# Patient Record
Sex: MALE | Race: BLACK OR AFRICAN AMERICAN | NOT HISPANIC OR LATINO | Employment: OTHER | URBAN - METROPOLITAN AREA
[De-identification: names, ages, dates, MRNs, and addresses within clinical notes are randomized per-mention and may not be internally consistent; named-entity substitution may affect disease eponyms.]

---

## 2023-03-08 ENCOUNTER — TELEPHONE (OUTPATIENT)
Dept: PRIMARY CARE | Facility: CLINIC | Age: 68
End: 2023-03-08
Payer: COMMERCIAL

## 2023-03-08 DIAGNOSIS — J44.9 CHRONIC OBSTRUCTIVE PULMONARY DISEASE, UNSPECIFIED COPD TYPE (MULTI): ICD-10-CM

## 2023-03-08 DIAGNOSIS — Z00.00 HEALTH CARE MAINTENANCE: ICD-10-CM

## 2023-03-08 DIAGNOSIS — I73.9 CLAUDICATION OF RIGHT LOWER EXTREMITY (CMS-HCC): ICD-10-CM

## 2023-03-08 RX ORDER — PREDNISONE 5 MG/1
1 TABLET ORAL DAILY
COMMUNITY
Start: 2023-01-25 | End: 2023-03-08 | Stop reason: SDUPTHER

## 2023-03-08 RX ORDER — NITROGLYCERIN 0.4 MG/1
TABLET SUBLINGUAL
COMMUNITY
Start: 2022-01-25 | End: 2023-03-08 | Stop reason: SDUPTHER

## 2023-03-08 RX ORDER — RIVAROXABAN 2.5 MG/1
2.5 TABLET, FILM COATED ORAL 2 TIMES DAILY
COMMUNITY
End: 2023-03-08 | Stop reason: SDUPTHER

## 2023-03-09 RX ORDER — PREDNISONE 5 MG/1
5 TABLET ORAL DAILY
Qty: 90 TABLET | Refills: 3 | Status: SHIPPED | OUTPATIENT
Start: 2023-03-09 | End: 2024-02-26 | Stop reason: SDUPTHER

## 2023-03-09 RX ORDER — NITROGLYCERIN 0.4 MG/1
0.4 TABLET SUBLINGUAL EVERY 5 MIN PRN
Qty: 30 TABLET | Refills: 3 | Status: SHIPPED | OUTPATIENT
Start: 2023-03-09 | End: 2024-04-12 | Stop reason: ALTCHOICE

## 2023-03-09 RX ORDER — RIVAROXABAN 2.5 MG/1
2.5 TABLET, FILM COATED ORAL 2 TIMES DAILY
Qty: 180 TABLET | Refills: 3 | Status: SHIPPED | OUTPATIENT
Start: 2023-03-09 | End: 2024-04-12 | Stop reason: ALTCHOICE

## 2023-03-16 ENCOUNTER — TELEPHONE (OUTPATIENT)
Dept: PRIMARY CARE | Facility: CLINIC | Age: 68
End: 2023-03-16
Payer: COMMERCIAL

## 2023-03-16 DIAGNOSIS — E11.9 TYPE 2 DIABETES MELLITUS WITHOUT COMPLICATION, WITHOUT LONG-TERM CURRENT USE OF INSULIN (MULTI): ICD-10-CM

## 2023-03-16 RX ORDER — BLOOD-GLUCOSE METER
EACH MISCELLANEOUS
COMMUNITY
End: 2023-03-16 | Stop reason: SDUPTHER

## 2023-03-17 RX ORDER — BLOOD-GLUCOSE METER
EACH MISCELLANEOUS
Qty: 100 STRIP | Refills: 3 | Status: SHIPPED | OUTPATIENT
Start: 2023-03-17 | End: 2023-09-22 | Stop reason: ALTCHOICE

## 2023-03-20 ENCOUNTER — TELEPHONE (OUTPATIENT)
Dept: PRIMARY CARE | Facility: CLINIC | Age: 68
End: 2023-03-20
Payer: COMMERCIAL

## 2023-03-20 DIAGNOSIS — E11.9 TYPE 2 DIABETES MELLITUS WITHOUT COMPLICATION, WITHOUT LONG-TERM CURRENT USE OF INSULIN (MULTI): Primary | ICD-10-CM

## 2023-03-21 LAB
NON-UH HIE CALCULATED LDL CHOLESTEROL: 52 MG/DL (ref 60–130)
NON-UH HIE CHOLESTEROL: 168 MG/DL (ref 100–200)
NON-UH HIE HDL CHOLESTEROL: 106 MG/DL (ref 40–60)
NON-UH HIE HGB A1C: 5.5 %
NON-UH HIE PROSTATIC SPECIFIC ANTIGEN: 1.3 NG/ML (ref 0–4)
NON-UH HIE SED RATE WESTERGREN: 8 MM/HR (ref 0–20)
NON-UH HIE TOTAL CHOL/HDL CHOL RATIO: 1.6
NON-UH HIE TRIGLYCERIDES: 51 MG/DL (ref 30–150)
NON-UH HIE TSH: 0.37 UIU/ML (ref 0.55–4.78)
NON-UH HIE URIC ACID: 2.6 (ref 3.7–9.2)
NON-UH HIE VIT D 25: 25 NG/ML

## 2023-03-21 RX ORDER — LANCETS
1 EACH MISCELLANEOUS DAILY
Qty: 100 EACH | Refills: 3 | Status: SHIPPED | OUTPATIENT
Start: 2023-03-21 | End: 2023-08-01 | Stop reason: ALTCHOICE

## 2023-03-22 LAB — NON-UH HIE ANTI-NUCLEAR ANTIBODY: NEGATIVE

## 2023-03-23 LAB — NON-UH HIE RHEUMATOID FACTOR: NEGATIVE

## 2023-04-06 LAB
NON-UH HIE APPEARANCE, U: CLEAR
NON-UH HIE BACTERIA, U: ABNORMAL
NON-UH HIE BILIRUBIN, U: NEGATIVE
NON-UH HIE BLOOD, U: NEGATIVE
NON-UH HIE COLOR, U: YELLOW
NON-UH HIE CREATININE, URINE MG/DL: 112.8 MG/DL
NON-UH HIE GLUCOSE QUAL, U: NEGATIVE
NON-UH HIE KETONES, U: NEGATIVE
NON-UH HIE LEUKOCYTE ESTERASE, U: ABNORMAL
NON-UH HIE MICROALBUMIN, URINE MG/L: <5 MG/L
NON-UH HIE MICROALBUMIN/CREATININE RATIO: <3 MG MALB/GM CREAT (ref 0–30)
NON-UH HIE NITRITE, U: NEGATIVE
NON-UH HIE PH, U: 5 (ref 4.5–8)
NON-UH HIE PROTEIN, U: NEGATIVE
NON-UH HIE RBC/HPF, U: 1 #/HPF (ref 0–3)
NON-UH HIE SPECIFIC GRAVITY, U: 1.01 (ref 1–1.03)
NON-UH HIE SQUAMOUS EPITHELIAL CELLS, U: <1 #/HPF
NON-UH HIE U MICRO: ABNORMAL
NON-UH HIE UROBILINOGEN QUAL, U: ABNORMAL
NON-UH HIE WBC/HPF, U: 2 #/HPF (ref 0–5)

## 2023-04-07 ENCOUNTER — TELEPHONE (OUTPATIENT)
Dept: PRIMARY CARE | Facility: CLINIC | Age: 68
End: 2023-04-07
Payer: COMMERCIAL

## 2023-04-07 DIAGNOSIS — N39.0 URINARY TRACT INFECTION WITHOUT HEMATURIA, SITE UNSPECIFIED: ICD-10-CM

## 2023-04-07 RX ORDER — CEPHALEXIN 250 MG/1
250 CAPSULE ORAL 2 TIMES DAILY
Qty: 6 CAPSULE | Refills: 0 | Status: SHIPPED | OUTPATIENT
Start: 2023-04-07 | End: 2023-06-26 | Stop reason: ALTCHOICE

## 2023-04-07 NOTE — TELEPHONE ENCOUNTER
----- Message from Preeti Slater MD sent at 4/7/2023 12:24 PM EDT -----  Mild UTI advised Macrobid 100 twice a day for 3 days

## 2023-04-07 NOTE — TELEPHONE ENCOUNTER
Advised and he would rather take a tablet form so I checked with dr carr and he said it is okay that we call in keflex 250mg bid #6     Pt aware

## 2023-05-01 ENCOUNTER — TELEPHONE (OUTPATIENT)
Dept: PRIMARY CARE | Facility: CLINIC | Age: 68
End: 2023-05-01
Payer: COMMERCIAL

## 2023-05-01 DIAGNOSIS — J30.9 ALLERGIC RHINITIS, UNSPECIFIED SEASONALITY, UNSPECIFIED TRIGGER: ICD-10-CM

## 2023-05-01 DIAGNOSIS — J44.9 CHRONIC OBSTRUCTIVE PULMONARY DISEASE, UNSPECIFIED COPD TYPE (MULTI): ICD-10-CM

## 2023-05-01 DIAGNOSIS — E11.9 TYPE 2 DIABETES MELLITUS WITHOUT COMPLICATION, WITHOUT LONG-TERM CURRENT USE OF INSULIN (MULTI): ICD-10-CM

## 2023-05-01 DIAGNOSIS — M10.9 GOUT, UNSPECIFIED CAUSE, UNSPECIFIED CHRONICITY, UNSPECIFIED SITE: ICD-10-CM

## 2023-05-01 RX ORDER — FEBUXOSTAT 40 MG/1
1 TABLET, FILM COATED ORAL DAILY
COMMUNITY
Start: 2023-01-27 | End: 2023-05-01 | Stop reason: SDUPTHER

## 2023-05-01 RX ORDER — ROFLUMILAST 500 UG/1
TABLET ORAL
Qty: 90 TABLET | Refills: 1 | Status: SHIPPED | OUTPATIENT
Start: 2023-05-01 | End: 2023-06-05 | Stop reason: SDUPTHER

## 2023-05-01 RX ORDER — MONTELUKAST SODIUM 10 MG/1
10 TABLET ORAL DAILY
COMMUNITY
End: 2023-05-01 | Stop reason: SDUPTHER

## 2023-05-01 RX ORDER — MONTELUKAST SODIUM 10 MG/1
10 TABLET ORAL DAILY
Qty: 90 TABLET | Refills: 3 | Status: SHIPPED | OUTPATIENT
Start: 2023-05-01 | End: 2024-04-18

## 2023-05-01 RX ORDER — FEBUXOSTAT 40 MG/1
40 TABLET, FILM COATED ORAL DAILY
Qty: 90 TABLET | Refills: 3 | Status: SHIPPED | OUTPATIENT
Start: 2023-05-01 | End: 2023-08-01 | Stop reason: ALTCHOICE

## 2023-05-01 RX ORDER — FLUTICASONE PROPIONATE 50 MCG
1 SPRAY, SUSPENSION (ML) NASAL DAILY
Qty: 16 G | Refills: 3 | Status: SHIPPED | OUTPATIENT
Start: 2023-05-01 | End: 2024-04-12 | Stop reason: ALTCHOICE

## 2023-05-03 RX ORDER — SITAGLIPTIN 50 MG/1
50 TABLET, FILM COATED ORAL DAILY
COMMUNITY
End: 2023-05-03 | Stop reason: SDUPTHER

## 2023-05-08 ENCOUNTER — TELEPHONE (OUTPATIENT)
Dept: PRIMARY CARE | Facility: CLINIC | Age: 68
End: 2023-05-08
Payer: COMMERCIAL

## 2023-05-08 DIAGNOSIS — Z00.00 HEALTH CARE MAINTENANCE: ICD-10-CM

## 2023-05-12 RX ORDER — AMITRIPTYLINE HYDROCHLORIDE 10 MG/1
10 TABLET, FILM COATED ORAL NIGHTLY
Qty: 90 TABLET | Refills: 3 | Status: SHIPPED | OUTPATIENT
Start: 2023-05-12 | End: 2023-06-26 | Stop reason: ALTCHOICE

## 2023-05-12 RX ORDER — GLIMEPIRIDE 4 MG/1
4 TABLET ORAL
Qty: 90 TABLET | Refills: 3 | Status: SHIPPED | OUTPATIENT
Start: 2023-05-12 | End: 2023-06-26 | Stop reason: ALTCHOICE

## 2023-05-16 ENCOUNTER — TELEPHONE (OUTPATIENT)
Dept: PRIMARY CARE | Facility: CLINIC | Age: 68
End: 2023-05-16
Payer: COMMERCIAL

## 2023-05-16 DIAGNOSIS — E11.9 TYPE 2 DIABETES MELLITUS WITHOUT COMPLICATION, WITHOUT LONG-TERM CURRENT USE OF INSULIN (MULTI): ICD-10-CM

## 2023-05-16 RX ORDER — LINAGLIPTIN 5 MG/1
5 TABLET, FILM COATED ORAL DAILY
Qty: 90 TABLET | Refills: 3 | Status: SHIPPED | OUTPATIENT
Start: 2023-05-16 | End: 2023-06-26 | Stop reason: ALTCHOICE

## 2023-06-05 ENCOUNTER — TELEPHONE (OUTPATIENT)
Dept: PRIMARY CARE | Facility: CLINIC | Age: 68
End: 2023-06-05
Payer: COMMERCIAL

## 2023-06-05 DIAGNOSIS — J44.9 CHRONIC OBSTRUCTIVE PULMONARY DISEASE, UNSPECIFIED COPD TYPE (MULTI): ICD-10-CM

## 2023-06-06 RX ORDER — ROFLUMILAST 500 UG/1
500 TABLET ORAL DAILY
Qty: 90 TABLET | Refills: 1 | Status: SHIPPED | OUTPATIENT
Start: 2023-06-06 | End: 2023-08-01 | Stop reason: SDUPTHER

## 2023-06-23 ENCOUNTER — PATIENT OUTREACH (OUTPATIENT)
Dept: CARE COORDINATION | Facility: CLINIC | Age: 68
End: 2023-06-23
Payer: COMMERCIAL

## 2023-06-23 DIAGNOSIS — E16.2 HYPOGLYCEMIA: ICD-10-CM

## 2023-06-23 NOTE — PROGRESS NOTES
Discharge Facility:[facility] Diagnosis:Recurrent Hypoglycemia  Admission Date:6/20/23  Discharge Date:6/22/23    PCP Appointment Date:Task sent to office  Specialist Appointment Date:   Hospital Encounter and Summary: not available at this time   See discharge assessment below for further details    Engagement  Call Start Time: 1004 (6/23/2023 10:03 AM)    Medications  Medications reviewed with patient/caregiver?: Yes (6/23/2023 10:03 AM)  Is the patient having any side effects they believe may be caused by any medication additions or changes?: No (6/23/2023 10:03 AM)  Does the patient have all medications ordered at discharge?: Not applicable (Patient advised by hospital to stop all diabetic medications until follow up) (6/23/2023 10:03 AM)  Care Management Interventions: Provided patient education (6/23/2023 10:03 AM)  Is the patient taking all medications as directed (includes completed medication regime)?: Yes (6/23/2023 10:03 AM)  Care Management Interventions: Provided patient education (6/23/2023 10:03 AM)  Medication Comments: Patient advised by hospital to stop all diabetic medications until follow up (6/23/2023 10:03 AM)    Appointments  Does the patient have a primary care provider?: Yes (6/23/2023 10:03 AM)  Care Management Interventions: Educated patient on importance of making appointment; Advised patient to make appointment (6/23/2023 10:03 AM)  Has the patient kept scheduled appointments due by today?: Yes (6/23/2023 10:03 AM)    Self Management  Has home health visited the patient within 72 hours of discharge?: Not applicable (6/23/2023 10:03 AM)    Patient Teaching  Does the patient have access to their discharge instructions?: Yes (6/23/2023 10:03 AM)  Care Management Interventions: Reviewed instructions with patient (6/23/2023 10:03 AM)  What is the patient's perception of their health status since discharge?: Same (6/23/2023 10:03 AM)  Is the patient/caregiver able to teach  back the hierarchy of who to call/visit for symptoms/problems? PCP, Specialist, Home Health nurse, Urgent Care, ED, 911: Yes (6/23/2023 10:03 AM)

## 2023-06-26 ENCOUNTER — TELEMEDICINE (OUTPATIENT)
Dept: PRIMARY CARE | Facility: CLINIC | Age: 68
End: 2023-06-26
Payer: COMMERCIAL

## 2023-06-26 DIAGNOSIS — E16.2 HYPOGLYCEMIA: Primary | ICD-10-CM

## 2023-06-26 DIAGNOSIS — I25.119 ATHEROSCLEROSIS OF NATIVE CORONARY ARTERY OF NATIVE HEART WITH ANGINA PECTORIS (CMS-HCC): ICD-10-CM

## 2023-06-26 DIAGNOSIS — E11.42 POLYNEUROPATHY DUE TO TYPE 2 DIABETES MELLITUS (MULTI): ICD-10-CM

## 2023-06-26 DIAGNOSIS — E46 PROTEIN-CALORIE MALNUTRITION, UNSPECIFIED SEVERITY (MULTI): ICD-10-CM

## 2023-06-26 DIAGNOSIS — I50.84 END STAGE HEART FAILURE (MULTI): ICD-10-CM

## 2023-06-26 DIAGNOSIS — Z09 HOSPITAL DISCHARGE FOLLOW-UP: ICD-10-CM

## 2023-06-26 DIAGNOSIS — I70.223 ATHEROSCLEROSIS OF NATIVE ARTERIES OF EXTREMITIES WITH REST PAIN, BILATERAL LEGS (MULTI): ICD-10-CM

## 2023-06-26 DIAGNOSIS — M87.00 AVN (AVASCULAR NECROSIS OF BONE) (MULTI): ICD-10-CM

## 2023-06-26 DIAGNOSIS — L97.521 ULCER OF LEFT FOOT, LIMITED TO BREAKDOWN OF SKIN (MULTI): ICD-10-CM

## 2023-06-26 DIAGNOSIS — J43.2 CENTRILOBULAR EMPHYSEMA (MULTI): ICD-10-CM

## 2023-06-26 DIAGNOSIS — M35.3 POLYMYALGIA RHEUMATICA (MULTI): ICD-10-CM

## 2023-06-26 PROBLEM — Z93.0 STATUS POST TRACHEOSTOMY (MULTI): Status: ACTIVE | Noted: 2023-06-26

## 2023-06-26 PROCEDURE — 99495 TRANSJ CARE MGMT MOD F2F 14D: CPT | Performed by: INTERNAL MEDICINE

## 2023-06-26 NOTE — ASSESSMENT & PLAN NOTE
Discussed with the endocrinologist face to face visit with patient discuss discharge medication and outpatient medication ceconciliations and answers all questions to patient's and caregiver review hospital record pending diagnostic test and treatment orders to improved coordinate care across the medical community and  referal with provider/service to support patient's health and health related problems to increase patient's satisfaction by reducing risk of readmission I improving And meeting patient's needs advise bring all prescription and nonprescription medication with you.

## 2023-06-26 NOTE — ASSESSMENT & PLAN NOTE
Dietitian and wound care evaluation control BMI blood pressure LDL cholesterol local triple antibiotic cream

## 2023-06-26 NOTE — ASSESSMENT & PLAN NOTE
Secondary to taking glimepiride hospitalized given dextrose 50 and dextrose 10 IV discontinue all oral and insulin dietitian evaluation endocrine evaluation discussed with endocrinologist and dietitian no diabetic medication repeat CBC BMP cortisol level and A1c in 4 weeks

## 2023-06-26 NOTE — PROGRESS NOTES
Subjective   Patient ID: Brian Low is a 68 y.o. male who presents for Hospital Follow-up (Discuss medications /Bs-112 this morning /).    Assessment/Plan     Problem List Items Addressed This Visit          Nervous    Polymyalgia rheumatica (CMS/HCC)    Polyneuropathy due to type 2 diabetes mellitus (CMS/HCC)       Respiratory    Chronic obstructive pulmonary disease (CMS/HCC)     Flu pneumonia COVID-19 vaccine inhaler            Circulatory    End stage heart failure (CMS/HCC)     Discussed with the cardiology CHF education provided cardiology clinic CHF clinic to follow         Atherosclerosis of native arteries of extremities with rest pain, bilateral legs (CMS/HCC)     Continue anticoagulation         Atherosclerosis of native coronary artery of native heart with angina pectoris (CMS/HCC)       Musculoskeletal    Ulcer of left foot, limited to breakdown of skin (CMS/HCC)     Dietitian and wound care evaluation control BMI blood pressure LDL cholesterol local triple antibiotic cream         AVN (avascular necrosis of bone) (CMS/HCC)     Check DEXA scan advised to get Prolia vitamin C vitamin D calcium supplement            Endocrine/Metabolic    Protein-calorie malnutrition, unspecified severity (CMS/HCC)    Hypoglycemia - Primary     Secondary to taking glimepiride hospitalized given dextrose 50 and dextrose 10 IV discontinue all oral and insulin dietitian evaluation endocrine evaluation discussed with endocrinologist and dietitian no diabetic medication repeat CBC BMP cortisol level and A1c in 4 weeks            Other    Hospital discharge follow-up     Discussed with the endocrinologist face to face visit with patient discuss discharge medication and outpatient medication ceconciliations and answers all questions to patient's and caregiver review hospital record pending diagnostic test and treatment orders to improved coordinate care across the medical community and  referal with provider/service to  support patient's health and health related problems to increase patient's satisfaction by reducing risk of readmission I improving And meeting patient's needs advise bring all prescription and nonprescription medication with you.            Patient hospitalized since last visit medication list reviewed and  reconciled with discharge medications  HPI 68-year-old patient who had a history of chronic kidney disease chronic heart disease chronic lung disease PAD CAD arrhythmias multiple hospitalization because of the cost he not able to afford some of the diabetic medication changed to the glimepiride glimepiride induced hypoglycemia blood sugar running between 4060-70s and up in the emergency room hospitalization seen by PCP endocrinology dietitian given patient dextrose 50 ampoule and add extra 10 IV stabilized the blood sugar advised not to take any oral hypoglycemic agent and follow-up in the office.  Patient checking blood sugar at home running between 100 220 no hypoglycemia or hypoglycemia negative for polyuria polydipsia    No nausea vomiting diarrhea constipation fever or chills or hypoxia or hypotension or hypoglycemia.    Not on File    Current Outpatient Medications   Medication Sig Dispense Refill    febuxostat (Uloric) 40 mg tablet Take 1 tablet (40 mg) by mouth once daily. 90 tablet 3    fluticasone (Flonase) 50 mcg/actuation nasal spray Administer 1 spray into each nostril once daily. Shake gently. Before first use, prime pump. After use, clean tip and replace cap. 16 g 3    lancets (OneTouch UltraSoft Lancets) misc 1 strip once daily. 100 each 3    montelukast (Singulair) 10 mg tablet Take 1 tablet (10 mg) by mouth once daily. 90 tablet 3    nitroglycerin (Nitrostat) 0.4 mg SL tablet Place 1 tablet (0.4 mg) under the tongue every 5 minutes if needed for chest pain. 30 tablet 3    OneTouch Verio test strips strip use 1 strip to check glucose once daily 100 strip 3    predniSONE (Deltasone) 5 mg tablet  Take 1 tablet (5 mg) by mouth once daily. 90 tablet 3    roflumilast (Daliresp) 500 mcg tablet Take 1 tablet (500 mcg) by mouth once daily. 90 tablet 1    Xarelto 2.5 mg tablet Take 1 tablet (2.5 mg) by mouth in the morning and 1 tablet (2.5 mg) before bedtime. 180 tablet 3     No current facility-administered medications for this visit.       Objective   Visit Vitals  Smoking Status Former     .Doxy  This visit was completed via video audio relation to  covid 19 pandemic all issues as below that discuss and address but no physical exam was performed if it was felt that patient should be evaluated in clinic and they have been advised to follow . Patient verbally consented to visit and spent  more than 50% discuss about patient's complaint of problem and plan      Immunization History   Administered Date(s) Administered    Juan SARS-CoV-2 Vaccination 03/12/2021, 11/03/2021       Review of Systems    Orders Only on 04/06/2023   Component Date Value Ref Range Status    [facility] Microalbumin/Creatinine* 04/06/2023 <3  0 - 30 mg MALB/gm CREAT Final    [facility] Microalbumin, Urine mg/L 04/06/2023 <5.0  mg/L Final    [facility] Creatinine, Urine mg/dl 04/06/2023 112.8  mg/dL Final    [facility] Leukocyte Esterase, U 04/06/2023 Small (A)  Negative Final    [facility] Bilirubin, U 04/06/2023 Negative  Negative Final    [facility] Nitrite, U 04/06/2023 Negative  Negative Final    [facility] pH, U 04/06/2023 5.0  4.5 - 8.0 Final    [facility] Appearance, U 04/06/2023 Clear   Final    [facility] Bacteria, U 04/06/2023 Occasional   Final    [facility] Color, U 04/06/2023 Yellow   Final    [facility] Blood, U 04/06/2023 Negative  Negative Final    [facility] Squamous Epithelial Abimbola* 04/06/2023 <1  #/HPF Final    [facility] Glucose Qual, U 04/06/2023 Negative  Negative Final    [facility] Urobilinogen Qual, U 04/06/2023 <2.0 mg/dl  <2.0 mg/dl Final    [facility] WBC/HPF, U 04/06/2023 2  0 - 5 #/HPF Final    [facility]  Specific Gravity, U 04/06/2023 1.015  1.001 - 1.035 Final    [facility] Protein, U 04/06/2023 Negative  Negative Final    [facility] RBC/HPF, U 04/06/2023 1  0 - 3 #/HPF Final    [facility] Ketones, U 04/06/2023 Negative  Negative Final    [facility] U MICRO 04/06/2023 Indicated   Final   Orders Only on 03/23/2023   Component Date Value Ref Range Status    [facility] Rheumatoid Factor 03/23/2023 Negative   Final   Orders Only on 03/22/2023   Component Date Value Ref Range Status    [facility] Anti-Nuclear Antibody 03/22/2023 Negative   Final   Orders Only on 03/21/2023   Component Date Value Ref Range Status    [facility] HGB A1C 03/21/2023 5.5  % Final   Orders Only on 03/21/2023   Component Date Value Ref Range Status    [facility] Prostatic Specific Anti* 03/21/2023 1.3  0.0 - 4.0 ng/mL Final    [facility] Sed Rate Westergren 03/21/2023 8  0 - 20 mm/hr Final    [facility] TSH 03/21/2023 0.37 (L)  0.55 - 4.78 uIU/ml Final    [facility] Vit D 25 03/21/2023 25  ng/mL Final    [facility] Uric Acid 03/21/2023 2.6 (L)  3.7 - 9.2 Final    [facility] Triglycerides 03/21/2023 51  30 - 150 mg/dL Final    [facility] Cholesterol 03/21/2023 168  100 - 200 mg/dL Final    [facility] Calculated LDL Choleste* 03/21/2023 52 (L)  60 - 130 mg/dL Final    [facility] HDL Cholesterol 03/21/2023 106 (H)  40 - 60 mg/dL Final    [facility] Total Chol/HDL Chol Rat* 03/21/2023 1.6   Final       Radiology: Reviewed imaging in powerchart.  No results found.    No family history on file.  Social History     Socioeconomic History    Marital status: Single     Spouse name: None    Number of children: None    Years of education: None    Highest education level: None   Occupational History    None   Tobacco Use    Smoking status: Former     Types: Cigarettes    Smokeless tobacco: Former   Substance and Sexual Activity    Alcohol use: Not Currently    Drug use: Not Currently    Sexual activity: None   Other Topics Concern    None   Social History  Narrative    None     Social Determinants of Health     Financial Resource Strain: Not on file   Food Insecurity: Not on file   Transportation Needs: Not on file   Physical Activity: Not on file   Stress: Not on file   Social Connections: Not on file   Intimate Partner Violence: Not on file   Housing Stability: Not on file     Past Medical History:   Diagnosis Date    Abnormal level of blood mineral 12/29/2017    Low magnesium levels    Anorexia 04/19/2021    Anorexia    Carpal tunnel syndrome, right upper limb 03/17/2022    Carpal tunnel syndrome of right wrist    Effusion, unspecified ankle 07/02/2019    Ankle edema    Encounter for immunization 01/25/2022    Encounter for immunization    Encounter for screening for malignant neoplasm of colon 01/25/2022    Colon cancer screening    Encounter for screening for malignant neoplasm of rectum 01/25/2022    Screening for rectal cancer    Essential (primary) hypertension 09/25/2020    Benign essential hypertension    Heart failure, unspecified (CMS/HCC) 12/28/2017    CHF (NYHA class III, ACC/AHA stage C)    Hydrocele, unspecified 06/06/2016    Hydrocele, left    Hydrocele, unspecified 09/28/2015    Left hydrocele    Irritable bowel syndrome with constipation 10/06/2022    Irritable bowel syndrome with constipation    Left lower quadrant pain 11/09/2015    Inguinal pain, left    Other conditions influencing health status 02/09/2016    History of cough    Other long term (current) drug therapy 09/25/2020    Long term use of drug    Other specified postprocedural states 07/16/2020    H/O right wrist surgery    Other symptoms and signs involving the musculoskeletal system 01/25/2022    Leg weakness    Pain in left hip 02/18/2020    Left hip pain    Personal history of colonic polyps 07/09/2018    History of colonic polyps    Personal history of diseases of the blood and blood-forming organs and certain disorders involving the immune mechanism 08/17/2020    History of anemia     Personal history of other (healed) physical injury and trauma 01/25/2022    History of trauma    Personal history of other diseases of male genital organs 12/04/2019    History of impotence    Personal history of other diseases of the circulatory system 08/17/2020    History of hypertension    Personal history of other diseases of the digestive system 08/25/2022    History of constipation    Personal history of other diseases of the musculoskeletal system and connective tissue 09/25/2020    History of polymyalgia rheumatica    Personal history of other diseases of the nervous system and sense organs 04/12/2017    History of otitis media    Personal history of other endocrine, nutritional and metabolic disease 04/15/2022    History of diabetes mellitus    Personal history of other endocrine, nutritional and metabolic disease 01/25/2022    History of vitamin D deficiency    Personal history of other endocrine, nutritional and metabolic disease 08/17/2020    History of hyperlipidemia    Personal history of other specified conditions 11/09/2015    History of abdominal pain    Radiculopathy, cervical region 07/16/2020    Cervical radiculopathy    Radiculopathy, lumbar region 09/25/2020    Chronic lumbar radiculopathy    Rash and other nonspecific skin eruption 01/25/2022    Skin rash    Right lower quadrant pain 06/23/2014    Inguinal pain of both sides    Scrotal pain 09/17/2015    Scrotal pain    Spinal stenosis, lumbosacral region 01/25/2022    Lumbosacral stenosis with neurogenic claudication     Past Surgical History:   Procedure Laterality Date    CARDIAC SURGERY  05/08/2014    Heart Surgery    OTHER SURGICAL HISTORY  08/25/2022    Leg surgery       Charting was completed using voice recognition technology and may include unintended errors.

## 2023-07-21 ENCOUNTER — PATIENT OUTREACH (OUTPATIENT)
Dept: CARE COORDINATION | Facility: CLINIC | Age: 68
End: 2023-07-21
Payer: COMMERCIAL

## 2023-07-21 NOTE — PROGRESS NOTES
Unable to reach patient for call back after patient's follow up appointment with PCP.   KYAM with call back number for patient to call if needed   If no voicemail available call attempts x 2 were made to contact the patient to assist with any questions or concerns patient may have.

## 2023-07-25 ENCOUNTER — TELEPHONE (OUTPATIENT)
Dept: PRIMARY CARE | Facility: CLINIC | Age: 68
End: 2023-07-25
Payer: COMMERCIAL

## 2023-07-25 DIAGNOSIS — I73.9 PAD (PERIPHERAL ARTERY DISEASE) (CMS-HCC): ICD-10-CM

## 2023-07-26 RX ORDER — GABAPENTIN 300 MG/1
300 CAPSULE ORAL 2 TIMES DAILY
Qty: 180 CAPSULE | Refills: 1 | Status: SHIPPED | OUTPATIENT
Start: 2023-07-26 | End: 2023-08-01 | Stop reason: ALTCHOICE

## 2023-07-26 RX ORDER — GABAPENTIN 300 MG/1
300 CAPSULE ORAL 2 TIMES DAILY
COMMUNITY
End: 2023-07-26 | Stop reason: SDUPTHER

## 2023-07-27 ENCOUNTER — OFFICE VISIT (OUTPATIENT)
Dept: PRIMARY CARE | Facility: CLINIC | Age: 68
End: 2023-07-27
Payer: COMMERCIAL

## 2023-07-27 VITALS
HEART RATE: 62 BPM | DIASTOLIC BLOOD PRESSURE: 60 MMHG | TEMPERATURE: 97.6 F | OXYGEN SATURATION: 94 % | BODY MASS INDEX: 20 KG/M2 | SYSTOLIC BLOOD PRESSURE: 118 MMHG | HEIGHT: 68 IN | WEIGHT: 132 LBS

## 2023-07-27 DIAGNOSIS — I50.84 END STAGE HEART FAILURE (MULTI): ICD-10-CM

## 2023-07-27 DIAGNOSIS — E11.9 TYPE 2 DIABETES MELLITUS WITHOUT COMPLICATION, WITHOUT LONG-TERM CURRENT USE OF INSULIN (MULTI): ICD-10-CM

## 2023-07-27 DIAGNOSIS — J43.2 CENTRILOBULAR EMPHYSEMA (MULTI): ICD-10-CM

## 2023-07-27 DIAGNOSIS — I25.119 ATHEROSCLEROSIS OF NATIVE CORONARY ARTERY OF NATIVE HEART WITH ANGINA PECTORIS (CMS-HCC): ICD-10-CM

## 2023-07-27 DIAGNOSIS — I70.223 ATHEROSCLEROSIS OF NATIVE ARTERIES OF EXTREMITIES WITH REST PAIN, BILATERAL LEGS (MULTI): ICD-10-CM

## 2023-07-27 DIAGNOSIS — E16.2 HYPOGLYCEMIA: Primary | ICD-10-CM

## 2023-07-27 PROBLEM — M87.00 AVN (AVASCULAR NECROSIS OF BONE) (MULTI): Status: RESOLVED | Noted: 2023-01-01 | Resolved: 2023-01-01

## 2023-07-27 PROBLEM — M35.3 POLYMYALGIA RHEUMATICA (MULTI): Status: RESOLVED | Noted: 2023-06-26 | Resolved: 2023-07-27

## 2023-07-27 PROBLEM — Z93.0 STATUS POST TRACHEOSTOMY (MULTI): Status: RESOLVED | Noted: 2023-01-01 | Resolved: 2023-01-01

## 2023-07-27 PROBLEM — E46 PROTEIN-CALORIE MALNUTRITION, UNSPECIFIED SEVERITY (MULTI): Status: RESOLVED | Noted: 2023-06-26 | Resolved: 2023-07-27

## 2023-07-27 PROBLEM — E11.42 POLYNEUROPATHY DUE TO TYPE 2 DIABETES MELLITUS (MULTI): Status: RESOLVED | Noted: 2023-06-26 | Resolved: 2023-07-27

## 2023-07-27 PROBLEM — L97.521 ULCER OF LEFT FOOT, LIMITED TO BREAKDOWN OF SKIN (MULTI): Status: RESOLVED | Noted: 2023-06-26 | Resolved: 2023-07-27

## 2023-07-27 PROCEDURE — 1125F AMNT PAIN NOTED PAIN PRSNT: CPT | Performed by: INTERNAL MEDICINE

## 2023-07-27 PROCEDURE — 1159F MED LIST DOCD IN RCRD: CPT | Performed by: INTERNAL MEDICINE

## 2023-07-27 PROCEDURE — 1157F ADVNC CARE PLAN IN RCRD: CPT | Performed by: INTERNAL MEDICINE

## 2023-07-27 PROCEDURE — 4010F ACE/ARB THERAPY RXD/TAKEN: CPT | Performed by: INTERNAL MEDICINE

## 2023-07-27 PROCEDURE — 1036F TOBACCO NON-USER: CPT | Performed by: INTERNAL MEDICINE

## 2023-07-27 PROCEDURE — 1160F RVW MEDS BY RX/DR IN RCRD: CPT | Performed by: INTERNAL MEDICINE

## 2023-07-27 PROCEDURE — 3078F DIAST BP <80 MM HG: CPT | Performed by: INTERNAL MEDICINE

## 2023-07-27 PROCEDURE — 3074F SYST BP LT 130 MM HG: CPT | Performed by: INTERNAL MEDICINE

## 2023-07-27 PROCEDURE — 99214 OFFICE O/P EST MOD 30 MIN: CPT | Performed by: INTERNAL MEDICINE

## 2023-07-27 RX ORDER — LINAGLIPTIN 5 MG/1
5 TABLET, FILM COATED ORAL DAILY
Qty: 30 TABLET | Refills: 11 | Status: SHIPPED | OUTPATIENT
Start: 2023-07-27 | End: 2023-09-22 | Stop reason: ALTCHOICE

## 2023-07-27 RX ORDER — GUAIFENESIN 1200 MG
TABLET, EXTENDED RELEASE 12 HR ORAL
COMMUNITY
Start: 2019-12-16 | End: 2023-08-01 | Stop reason: ALTCHOICE

## 2023-07-27 RX ORDER — ALENDRONATE SODIUM 70 MG/1
TABLET ORAL
COMMUNITY
Start: 2010-08-31 | End: 2023-12-07 | Stop reason: SDUPTHER

## 2023-07-27 RX ORDER — ALBUTEROL SULFATE 90 UG/1
AEROSOL, METERED RESPIRATORY (INHALATION)
COMMUNITY
End: 2023-09-22 | Stop reason: ALTCHOICE

## 2023-07-27 NOTE — ASSESSMENT & PLAN NOTE
Iatrogenic in nature advised discontinue glimepiride discontinue Tradjenta just monitor the blood sugar follow-up 4 weeks

## 2023-07-27 NOTE — PROGRESS NOTES
Subjective   Patient ID: Brian Low is a 68 y.o. male who presents for Follow-up (Should he be taking Cholestyram?).    Assessment/Plan     Problem List Items Addressed This Visit       Chronic obstructive pulmonary disease (CMS/[facility])    Relevant Orders    Albumin , Urine Random    CBC and Auto Differential    Comprehensive Metabolic Panel    Hemoglobin A1C    Lipid Panel    Magnesium    TSH with reflex to Free T4 if abnormal    Urinalysis Microscopic Only    End stage heart failure (CMS/[facility])     Pt has CHF, diastolic or systolic were specified, usually three times  a week weight monitoring, salt restriction up to 2 GM Na diet, fluid restriction and continuation of therapy as recommended to be continued. HOB can be kept somewhat elevated at night. Any dyspnea or more then 5 lb weight gain or leg swelling need to be notified, please call if any of above sympts happen and pt will be addressed if possible on outpatient setting. Light exercises are always beneficial with fresh air and deep breathing exercises. Please follow up with us as directed.          Atherosclerosis of native arteries of extremities with rest pain, bilateral legs (CMS/[facility])     Aspirin plus folic acid a day vascular evaluation for anticoagulation Xarelto         Atherosclerosis of native coronary artery of native heart with angina pectoris (CMS/[facility])     Discussed with the cardiology asymptomatic         Hypoglycemia - Primary     Iatrogenic in nature advised discontinue glimepiride discontinue Tradjenta just monitor the blood sugar follow-up 4 weeks          Other Visit Diagnoses       Type 2 diabetes mellitus without complication, without long-term current use of insulin (CMS/[facility])        Relevant Medications    linaGLIPtin (Tradjenta) 5 mg tablet    Other Relevant Orders    Albumin , Urine Random    CBC and Auto Differential    Comprehensive Metabolic Panel    Hemoglobin A1C    Lipid Panel    Magnesium    TSH with reflex to Free T4 if abnormal     Urinalysis Microscopic Only          Advised discontinue diabetic medication just check blood sugar once a week hemoglobin A1c every 4 months follow-up every 4 weeks  HPI  68-year-old patient who admitted in the hospital hypoglycemia glimepiride and Tradjenta we will discontinue symptom was improved comorbid condition review COPD chronic pain alcohol abuse hypertension hyperlipidemia osteoarthritis gouty arthritis PAD CAD    Accompanied with the wife complaining arthralgia myalgia fatigue tired weakness    Negative for headache chest pain hematuria rectal bleeding hypoxia or hematuria  Blood sugar was checked at home running between 78-99 without taking any medication hemoglobin A1c 5.5 without any medication  Past Medical History:   Diagnosis Date    Abnormal level of blood mineral 12/29/2017    Low magnesium levels    Anorexia 04/19/2021    Anorexia    Carpal tunnel syndrome, right upper limb 03/17/2022    Carpal tunnel syndrome of right wrist    Effusion, unspecified ankle 07/02/2019    Ankle edema    Encounter for immunization 01/25/2022    Encounter for immunization    Encounter for screening for malignant neoplasm of colon 01/25/2022    Colon cancer screening    Encounter for screening for malignant neoplasm of rectum 01/25/2022    Screening for rectal cancer    Essential (primary) hypertension 09/25/2020    Benign essential hypertension    Heart failure, unspecified (CMS/HCC) 12/28/2017    CHF (NYHA class III, ACC/AHA stage C)    Hydrocele, unspecified 06/06/2016    Hydrocele, left    Hydrocele, unspecified 09/28/2015    Left hydrocele    Irritable bowel syndrome with constipation 10/06/2022    Irritable bowel syndrome with constipation    Left lower quadrant pain 11/09/2015    Inguinal pain, left    Other conditions influencing health status 02/09/2016    History of cough    Other long term (current) drug therapy 09/25/2020    Long term use of drug    Other specified postprocedural states 07/16/2020     H/O right wrist surgery    Other symptoms and signs involving the musculoskeletal system 01/25/2022    Leg weakness    Pain in left hip 02/18/2020    Left hip pain    Personal history of colonic polyps 07/09/2018    History of colonic polyps    Personal history of diseases of the blood and blood-forming organs and certain disorders involving the immune mechanism 08/17/2020    History of anemia    Personal history of other (healed) physical injury and trauma 01/25/2022    History of trauma    Personal history of other diseases of male genital organs 12/04/2019    History of impotence    Personal history of other diseases of the circulatory system 08/17/2020    History of hypertension    Personal history of other diseases of the digestive system 08/25/2022    History of constipation    Personal history of other diseases of the musculoskeletal system and connective tissue 09/25/2020    History of polymyalgia rheumatica    Personal history of other diseases of the nervous system and sense organs 04/12/2017    History of otitis media    Personal history of other endocrine, nutritional and metabolic disease 04/15/2022    History of diabetes mellitus    Personal history of other endocrine, nutritional and metabolic disease 01/25/2022    History of vitamin D deficiency    Personal history of other endocrine, nutritional and metabolic disease 08/17/2020    History of hyperlipidemia    Personal history of other specified conditions 11/09/2015    History of abdominal pain    Radiculopathy, cervical region 07/16/2020    Cervical radiculopathy    Radiculopathy, lumbar region 09/25/2020    Chronic lumbar radiculopathy    Rash and other nonspecific skin eruption 01/25/2022    Skin rash    Right lower quadrant pain 06/23/2014    Inguinal pain of both sides    Scrotal pain 09/17/2015    Scrotal pain    Spinal stenosis, lumbosacral region 01/25/2022    Lumbosacral stenosis with neurogenic claudication     Past Surgical History:    Procedure Laterality Date    CARDIAC SURGERY  05/08/2014    Heart Surgery    OTHER SURGICAL HISTORY  08/25/2022    Leg surgery     Allergies   Allergen Reactions    Strawberry Unknown     Current Outpatient Medications   Medication Sig Dispense Refill    acetaminophen (Tylenol) 325 mg capsule Take by mouth.      alendronate (Fosamax) 70 mg tablet Take by mouth.      febuxostat (Uloric) 40 mg tablet Take 1 tablet (40 mg) by mouth once daily. 90 tablet 3    fluticasone (Flonase) 50 mcg/actuation nasal spray Administer 1 spray into each nostril once daily. Shake gently. Before first use, prime pump. After use, clean tip and replace cap. 16 g 3    gabapentin (Neurontin) 300 mg capsule Take 1 capsule (300 mg) by mouth 2 times a day. 180 capsule 1    lancets (OneTouch UltraSoft Lancets) misc 1 strip once daily. 100 each 3    montelukast (Singulair) 10 mg tablet Take 1 tablet (10 mg) by mouth once daily. 90 tablet 3    nitroglycerin (Nitrostat) 0.4 mg SL tablet Place 1 tablet (0.4 mg) under the tongue every 5 minutes if needed for chest pain. 30 tablet 3    Norco 5-325 mg tablet 1 tabs, ORAL, BID, Refill(s) 0, Date: 06/20/2023 17:23:00 EDT      predniSONE (Deltasone) 5 mg tablet Take 1 tablet (5 mg) by mouth once daily. 90 tablet 3    roflumilast (Daliresp) 500 mcg tablet Take 1 tablet (500 mcg) by mouth once daily. 90 tablet 1    Xarelto 2.5 mg tablet Take 1 tablet (2.5 mg) by mouth in the morning and 1 tablet (2.5 mg) before bedtime. 180 tablet 3    albuterol 90 mcg/actuation inhaler INHALE 1 PUFF BY MOUTH EVERY 8 HOURS AS NEEDED      linaGLIPtin (Tradjenta) 5 mg tablet Take 1 tablet (5 mg) by mouth once daily. 30 tablet 11    OneTouch Verio test strips strip use 1 strip to check glucose once daily 100 strip 3     No current facility-administered medications for this visit.     No family history on file.  Social History     Socioeconomic History    Marital status: Single     Spouse name: None    Number of children:  "None    Years of education: None    Highest education level: None   Occupational History    None   Tobacco Use    Smoking status: Former     Types: Cigarettes    Smokeless tobacco: Former   Substance and Sexual Activity    Alcohol use: Not Currently    Drug use: Not Currently    Sexual activity: None   Other Topics Concern    None   Social History Narrative    None     Social Determinants of Health     Financial Resource Strain: Not on file   Food Insecurity: Not on file   Transportation Needs: Not on file   Physical Activity: Not on file   Stress: Not on file   Social Connections: Not on file   Intimate Partner Violence: Not on file   Housing Stability: Not on file     Immunization History   Administered Date(s) Administered    Juan SARS-CoV-2 Vaccination 03/12/2021, 11/03/2021       Review of Systems  Review of systems is otherwise negative unless stated above or in history of present illness.    Objective   Visit Vitals  /60 (BP Location: Left arm, Patient Position: Sitting, BP Cuff Size: Adult)   Pulse 62   Temp 36.4 °C (97.6 °F)   Ht 1.727 m (5' 8\")   Wt 59.9 kg (132 lb)   SpO2 94%   BMI 20.07 kg/m²   Smoking Status Former   BSA 1.7 m²     Physical Exam  Constitutional: Cachexia of chronic disease     General: not in acute distress.   HENT:      Head: Normocephalic and atraumatic.      Nose: Nose normal.   Eyes:      Extraocular Movements: Extraocular movements intact.      Conjunctiva/sclera: Conjunctivae normal.   Cardiovascular: Systolic heart murmur     Rate and Rhythm: Normal rate ,   Pulmonary: Decreased air entry crackles rales rhonchi bilaterally barrel chest     Skin: Ischemic changes lower extremity     General: Skin is warm.   Neurological: Lumbar cervical radiculopathy     Mental Status: He is alert and oriented to person, place, and time.   Psychiatric:   Anxiety without depression   Mood and Affect: Mood normal.         Behavior: Behavior normal.   Musculoskeletal arthritis spine hip and " knee bilaterally  FROM in all extremitirs,  Joint-no swelling or tenderness    Orders Only on 04/06/2023   Component Date Value Ref Range Status    [facility] Microalbumin/Creatinine* 04/06/2023 <3  0 - 30 mg MALB/gm CREAT Final    [facility] Microalbumin, Urine mg/L 04/06/2023 <5.0  mg/L Final    [facility] Creatinine, Urine mg/dl 04/06/2023 112.8  mg/dL Final    [facility] Leukocyte Esterase, U 04/06/2023 Small (A)  Negative Final    [facility] Bilirubin, U 04/06/2023 Negative  Negative Final    [facility] Nitrite, U 04/06/2023 Negative  Negative Final    [facility] pH, U 04/06/2023 5.0  4.5 - 8.0 Final    [facility] Appearance, U 04/06/2023 Clear   Final    [facility] Bacteria, U 04/06/2023 Occasional   Final    [facility] Color, U 04/06/2023 Yellow   Final    [facility] Blood, U 04/06/2023 Negative  Negative Final    [facility] Squamous Epithelial Abimbola* 04/06/2023 <1  #/HPF Final    [facility] Glucose Qual, U 04/06/2023 Negative  Negative Final    [facility] Urobilinogen Qual, U 04/06/2023 <2.0 mg/dl  <2.0 mg/dl Final    [facility] WBC/HPF, U 04/06/2023 2  0 - 5 #/HPF Final    [facility] Specific Gravity, U 04/06/2023 1.015  1.001 - 1.035 Final    [facility] Protein, U 04/06/2023 Negative  Negative Final    [facility] RBC/HPF, U 04/06/2023 1  0 - 3 #/HPF Final    [facility] Ketones, U 04/06/2023 Negative  Negative Final    [facility] U MICRO 04/06/2023 Indicated   Final       Radiology: Reviewed imaging in powerchart.  No results found.      Charting was completed using voice recognition technology and may include unintended errors.

## 2023-08-01 ENCOUNTER — TELEPHONE (OUTPATIENT)
Dept: PRIMARY CARE | Facility: CLINIC | Age: 68
End: 2023-08-01
Payer: COMMERCIAL

## 2023-08-01 DIAGNOSIS — J44.9 CHRONIC OBSTRUCTIVE PULMONARY DISEASE, UNSPECIFIED COPD TYPE (MULTI): ICD-10-CM

## 2023-08-01 RX ORDER — LOVASTATIN 40 MG/1
40 TABLET ORAL NIGHTLY
COMMUNITY
Start: 2017-12-28 | End: 2023-09-22 | Stop reason: ALTCHOICE

## 2023-08-01 RX ORDER — LINACLOTIDE 72 UG/1
72 CAPSULE, GELATIN COATED ORAL DAILY
COMMUNITY
End: 2023-09-22 | Stop reason: ALTCHOICE

## 2023-08-01 RX ORDER — VALSARTAN 80 MG/1
80 TABLET ORAL DAILY
COMMUNITY
Start: 2021-05-04 | End: 2023-12-07 | Stop reason: SDUPTHER

## 2023-08-01 RX ORDER — GABAPENTIN 100 MG/1
100 CAPSULE ORAL 2 TIMES DAILY
COMMUNITY
Start: 2020-07-16 | End: 2024-04-12 | Stop reason: ALTCHOICE

## 2023-08-01 RX ORDER — OMEPRAZOLE 20 MG/1
1 CAPSULE, DELAYED RELEASE ORAL DAILY
COMMUNITY
Start: 2023-02-14 | End: 2024-04-12 | Stop reason: ALTCHOICE

## 2023-08-01 RX ORDER — MULTIVITAMIN
1 TABLET ORAL DAILY
COMMUNITY
End: 2023-09-22 | Stop reason: ALTCHOICE

## 2023-08-01 RX ORDER — ASPIRIN 81 MG/1
81 TABLET ORAL DAILY
COMMUNITY
Start: 2018-12-20 | End: 2024-04-12 | Stop reason: WASHOUT

## 2023-08-01 RX ORDER — ALLOPURINOL 100 MG/1
100 TABLET ORAL DAILY
COMMUNITY
Start: 2023-06-20 | End: 2023-09-22 | Stop reason: ALTCHOICE

## 2023-08-01 RX ORDER — ROFLUMILAST 500 UG/1
500 TABLET ORAL DAILY
Qty: 90 TABLET | Refills: 3 | Status: SHIPPED | OUTPATIENT
Start: 2023-08-01

## 2023-08-01 RX ORDER — DILTIAZEM HYDROCHLORIDE 180 MG/1
180 CAPSULE, EXTENDED RELEASE ORAL 2 TIMES DAILY
COMMUNITY
Start: 2019-05-23 | End: 2023-12-21 | Stop reason: SDUPTHER

## 2023-08-01 RX ORDER — LANCETS 33 GAUGE
EACH MISCELLANEOUS
COMMUNITY
Start: 2023-06-16 | End: 2023-09-22 | Stop reason: ALTCHOICE

## 2023-08-01 RX ORDER — FEBUXOSTAT 40 MG/1
40 TABLET, FILM COATED ORAL DAILY
COMMUNITY
Start: 2021-03-23 | End: 2023-08-25 | Stop reason: ALTCHOICE

## 2023-08-01 RX ORDER — CLONIDINE HYDROCHLORIDE 0.1 MG/1
1 TABLET ORAL 2 TIMES DAILY
COMMUNITY
Start: 2020-08-17 | End: 2024-04-12 | Stop reason: WASHOUT

## 2023-08-01 RX ORDER — ALBUTEROL SULFATE 0.83 MG/ML
2.5 SOLUTION RESPIRATORY (INHALATION)
COMMUNITY

## 2023-08-03 ENCOUNTER — TELEPHONE (OUTPATIENT)
Dept: PRIMARY CARE | Facility: CLINIC | Age: 68
End: 2023-08-03
Payer: COMMERCIAL

## 2023-08-17 ENCOUNTER — PATIENT OUTREACH (OUTPATIENT)
Dept: CARE COORDINATION | Facility: CLINIC | Age: 68
End: 2023-08-17
Payer: COMMERCIAL

## 2023-08-25 ENCOUNTER — OFFICE VISIT (OUTPATIENT)
Dept: PRIMARY CARE | Facility: CLINIC | Age: 68
End: 2023-08-25
Payer: COMMERCIAL

## 2023-08-25 VITALS
DIASTOLIC BLOOD PRESSURE: 75 MMHG | HEIGHT: 68 IN | TEMPERATURE: 97.3 F | BODY MASS INDEX: 19.7 KG/M2 | OXYGEN SATURATION: 96 % | WEIGHT: 130 LBS | SYSTOLIC BLOOD PRESSURE: 115 MMHG | HEART RATE: 87 BPM

## 2023-08-25 DIAGNOSIS — E11.9 DIET-CONTROLLED DIABETES MELLITUS (MULTI): ICD-10-CM

## 2023-08-25 DIAGNOSIS — I73.9 PAD (PERIPHERAL ARTERY DISEASE) (CMS-HCC): ICD-10-CM

## 2023-08-25 DIAGNOSIS — J43.2 CENTRILOBULAR EMPHYSEMA (MULTI): Primary | ICD-10-CM

## 2023-08-25 DIAGNOSIS — I25.119 ATHEROSCLEROSIS OF NATIVE CORONARY ARTERY OF NATIVE HEART WITH ANGINA PECTORIS (CMS-HCC): ICD-10-CM

## 2023-08-25 DIAGNOSIS — M13.0 POLYARTHRITIS: ICD-10-CM

## 2023-08-25 DIAGNOSIS — I50.82 BIVENTRICULAR CONGESTIVE HEART FAILURE (MULTI): ICD-10-CM

## 2023-08-25 PROBLEM — E16.2 HYPOGLYCEMIA: Status: RESOLVED | Noted: 2023-06-26 | Resolved: 2023-08-25

## 2023-08-25 PROBLEM — Z09 HOSPITAL DISCHARGE FOLLOW-UP: Status: RESOLVED | Noted: 2023-06-26 | Resolved: 2023-08-25

## 2023-08-25 PROBLEM — I50.84 END STAGE HEART FAILURE (MULTI): Status: RESOLVED | Noted: 2023-06-26 | Resolved: 2023-08-25

## 2023-08-25 PROCEDURE — 3074F SYST BP LT 130 MM HG: CPT | Performed by: INTERNAL MEDICINE

## 2023-08-25 PROCEDURE — 1036F TOBACCO NON-USER: CPT | Performed by: INTERNAL MEDICINE

## 2023-08-25 PROCEDURE — 1159F MED LIST DOCD IN RCRD: CPT | Performed by: INTERNAL MEDICINE

## 2023-08-25 PROCEDURE — 3078F DIAST BP <80 MM HG: CPT | Performed by: INTERNAL MEDICINE

## 2023-08-25 PROCEDURE — 99214 OFFICE O/P EST MOD 30 MIN: CPT | Performed by: INTERNAL MEDICINE

## 2023-08-25 PROCEDURE — 1160F RVW MEDS BY RX/DR IN RCRD: CPT | Performed by: INTERNAL MEDICINE

## 2023-08-25 PROCEDURE — 1125F AMNT PAIN NOTED PAIN PRSNT: CPT | Performed by: INTERNAL MEDICINE

## 2023-08-25 PROCEDURE — 4010F ACE/ARB THERAPY RXD/TAKEN: CPT | Performed by: INTERNAL MEDICINE

## 2023-08-25 PROCEDURE — 1157F ADVNC CARE PLAN IN RCRD: CPT | Performed by: INTERNAL MEDICINE

## 2023-08-25 NOTE — PROGRESS NOTES
Subjective   Patient ID: Brian Low is a 68 y.o. male who presents for Follow-up (1 month/Needs a lift chair rx/Also needs PT eval referral ).    Assessment/Plan     Problem List Items Addressed This Visit       Chronic obstructive pulmonary disease (CMS/[facility]) - Primary     CKD and various stages of CKD and significance explained, CKD is very common and rising diagnosis among [place] adults. Main culprits for CKD etiology needs to be attended well. GFR values explained. Nephrotoxic agents has to be avoided, plenty of water consumption is always beneficial. Avoid NSAIDs, always check with MD about usage of OTC medications or any other Rx given by other providers. GFR less then 10 usually will need renal replacement therapy. Episodic check on renal functions needed. Always ask MD about GFR at next visit. Avoid dehydration.           PAD (peripheral artery disease) (CMS/[facility])    Atherosclerosis of native coronary artery of native heart with angina pectoris (CMS/[facility])     Refer to cardiology for stress test         Polyarthritis     Physical therapy Occupational Therapy ambulatory problem because of the chronic lung disease heart disease and arthritis advised lift chair         Biventricular congestive heart failure (CMS/[facility])    Diet-controlled diabetes mellitus (CMS/[facility])   Pt has CHF, diastolic or systolic were specified, usually three times  a week weight monitoring, salt restriction up to 2 GM Na diet, fluid restriction and continuation of therapy as recommended to be continued. HOB can be kept somewhat elevated at night. Any dyspnea or more then 5 lb weight gain or leg swelling need to be notified, please call if any of above sympts happen and pt will be addressed if possible on outpatient setting. Light exercises are always beneficial with fresh air and deep breathing exercises. Please follow up with us as directed.     HPI this is a 68-year-old patient who had a history of polyarthritis osteopenia osteoarthritis  gastritis hypoxic respiratory failure PAD CAD claudication seen by pulmonary cardiology vascular complaining arthralgia myalgia fatigue tired decreased ADL mobility because of the hypoxia heart disease and peripheral artery disease    Negative for hematuria hemoptysis or chest pain or palpitation    Foot exam was done DEXA scan was ordered going to be evaluated by vascular Dr. Mota  Past Medical History:   Diagnosis Date    Abnormal level of blood mineral 12/29/2017    Low magnesium levels    Anorexia 04/19/2021    Anorexia    Carpal tunnel syndrome, right upper limb 03/17/2022    Carpal tunnel syndrome of right wrist    Effusion, unspecified ankle 07/02/2019    Ankle edema    Encounter for immunization 01/25/2022    Encounter for immunization    Encounter for screening for malignant neoplasm of colon 01/25/2022    Colon cancer screening    Encounter for screening for malignant neoplasm of rectum 01/25/2022    Screening for rectal cancer    Essential (primary) hypertension 09/25/2020    Benign essential hypertension    Heart failure, unspecified (CMS/HCC) 12/28/2017    CHF (NYHA class III, ACC/AHA stage C)    Hydrocele, unspecified 06/06/2016    Hydrocele, left    Hydrocele, unspecified 09/28/2015    Left hydrocele    Irritable bowel syndrome with constipation 10/06/2022    Irritable bowel syndrome with constipation    Left lower quadrant pain 11/09/2015    Inguinal pain, left    Other conditions influencing health status 02/09/2016    History of cough    Other long term (current) drug therapy 09/25/2020    Long term use of drug    Other specified postprocedural states 07/16/2020    H/O right wrist surgery    Other symptoms and signs involving the musculoskeletal system 01/25/2022    Leg weakness    Pain in left hip 02/18/2020    Left hip pain    Personal history of colonic polyps 07/09/2018    History of colonic polyps    Personal history of diseases of the blood and blood-forming organs and certain disorders  involving the immune mechanism 08/17/2020    History of anemia    Personal history of other (healed) physical injury and trauma 01/25/2022    History of trauma    Personal history of other diseases of male genital organs 12/04/2019    History of impotence    Personal history of other diseases of the circulatory system 08/17/2020    History of hypertension    Personal history of other diseases of the digestive system 08/25/2022    History of constipation    Personal history of other diseases of the musculoskeletal system and connective tissue 09/25/2020    History of polymyalgia rheumatica    Personal history of other diseases of the nervous system and sense organs 04/12/2017    History of otitis media    Personal history of other endocrine, nutritional and metabolic disease 04/15/2022    History of diabetes mellitus    Personal history of other endocrine, nutritional and metabolic disease 01/25/2022    History of vitamin D deficiency    Personal history of other endocrine, nutritional and metabolic disease 08/17/2020    History of hyperlipidemia    Personal history of other specified conditions 11/09/2015    History of abdominal pain    Radiculopathy, cervical region 07/16/2020    Cervical radiculopathy    Radiculopathy, lumbar region 09/25/2020    Chronic lumbar radiculopathy    Rash and other nonspecific skin eruption 01/25/2022    Skin rash    Right lower quadrant pain 06/23/2014    Inguinal pain of both sides    Scrotal pain 09/17/2015    Scrotal pain    Spinal stenosis, lumbosacral region 01/25/2022    Lumbosacral stenosis with neurogenic claudication     Past Surgical History:   Procedure Laterality Date    CARDIAC SURGERY  05/08/2014    Heart Surgery    OTHER SURGICAL HISTORY  08/25/2022    Leg surgery     Allergies   Allergen Reactions    Strawberry Unknown     Current Outpatient Medications   Medication Sig Dispense Refill    albuterol 2.5 mg /3 mL (0.083 %) nebulizer solution Take 3 mL (2.5 mg) by  nebulization.      albuterol 90 mcg/actuation inhaler INHALE 1 PUFF BY MOUTH EVERY 8 HOURS AS NEEDED      alendronate (Fosamax) 70 mg tablet Take by mouth.      allopurinol (Zyloprim) 100 mg tablet Take 1 tablet (100 mg) by mouth once daily.      aspirin 81 mg EC tablet Take 1 tablet (81 mg) by mouth once daily.      budesonide-glycopyr-formoterol (BREZTRI) 160-9-4.8 mcg/actuation HFA aerosol inhaler Inhale.      Cartia  mg 24 hr capsule Take 1 capsule (180 mg) by mouth 2 times a day.      cloNIDine (Catapres) 0.1 mg tablet Take 1 tablet (0.1 mg) by mouth 2 times a day.      fluticasone (Flonase) 50 mcg/actuation nasal spray Administer 1 spray into each nostril once daily. Shake gently. Before first use, prime pump. After use, clean tip and replace cap. 16 g 3    gabapentin (Neurontin) 100 mg capsule Take 1 capsule (100 mg) by mouth 2 times a day.      linaGLIPtin (Tradjenta) 5 mg tablet Take 1 tablet (5 mg) by mouth once daily. 30 tablet 11    Linzess 72 mcg capsule Take 1 capsule (72 mcg) by mouth once daily.      lovastatin (Mevacor) 40 mg tablet Take 1 tablet (40 mg) by mouth once daily at bedtime.      montelukast (Singulair) 10 mg tablet Take 1 tablet (10 mg) by mouth once daily. 90 tablet 3    multivitamin tablet Take 1 tablet by mouth once daily.      nitroglycerin (Nitrostat) 0.4 mg SL tablet Place 1 tablet (0.4 mg) under the tongue every 5 minutes if needed for chest pain. 30 tablet 3    Norco 5-325 mg tablet 1 tabs, ORAL, BID, Refill(s) 0, Date: 06/20/2023 17:23:00 EDT      omeprazole (PriLOSEC) 20 mg DR capsule Take 1 capsule (20 mg) by mouth once daily.      OneTouch Delica Plus Lancet 33 gauge misc USE 1 LANCET TO CHECK GLUCOSE ONCE DAILY      OneTouch Verio test strips strip use 1 strip to check glucose once daily 100 strip 3    predniSONE (Deltasone) 5 mg tablet Take 1 tablet (5 mg) by mouth once daily. 90 tablet 3    roflumilast (Daliresp) 500 mcg tablet Take 1 tablet (500 mcg) by mouth once  daily. 90 tablet 3    valsartan (Diovan) 80 mg tablet Take 1 tablet (80 mg) by mouth once daily.      Xarelto 2.5 mg tablet Take 1 tablet (2.5 mg) by mouth in the morning and 1 tablet (2.5 mg) before bedtime. 180 tablet 3     No current facility-administered medications for this visit.     No family history on file.  Social History     Socioeconomic History    Marital status: Single     Spouse name: None    Number of children: None    Years of education: None    Highest education level: None   Occupational History    None   Tobacco Use    Smoking status: Former     Types: Cigarettes    Smokeless tobacco: Former   Substance and Sexual Activity    Alcohol use: Not Currently    Drug use: Not Currently    Sexual activity: None   Other Topics Concern    None   Social History Narrative    None     Social Determinants of Health     Financial Resource Strain: Not on file   Food Insecurity: Not on file   Transportation Needs: Not on file   Physical Activity: Not on file   Stress: Not on file   Social Connections: Not on file   Intimate Partner Violence: Not on file   Housing Stability: Not on file     Immunization History   Administered Date(s) Administered    Flu vaccine (IIV4), preservative free *Check age/dose* 10/22/2014, 10/03/2018    Flu vaccine, quadrivalent, high-dose, preservative free, age 65y+ (FLUZONE) 10/22/2021, 10/03/2022    Hepatitis B vaccine, adult (RECOMBIVAX, ENGERIX) 11/13/2006, 12/11/2006    HiB, unspecified 11/21/2008    Influenza, Unspecified 11/16/2007, 10/10/2008, 01/05/2010, 09/06/2019, 09/25/2020    Influenza, injectable, quadrivalent 10/22/2021    Influenza, seasonal, injectable 09/06/2011, 09/27/2012, 09/17/2013, 10/22/2014, 08/09/2017    Influenza, seasonal, injectable, preservative free 11/09/2015    Juan SARS-CoV-2 Vaccination 03/12/2021, 11/03/2021    PPD Test 04/30/2007, 02/12/2008    Pfizer COVID-19 vaccine, bivalent, age 12 years and older (30 mcg/0.3 mL) 10/03/2022    Pfizer Gray Cap  "SARS-CoV-2 04/11/2022    Pneumococcal polysaccharide vaccine, 23-valent, age 2 years and older (PNEUMOVAX 23) 07/20/2005, 11/21/2008, 06/08/2012, 02/13/2015    Td vaccine, age 7 years and older (TDVAX) 06/08/2012    Tdap vaccine, age 10 years and older (BOOSTRIX) 12/10/2008       Review of Systems  Review of systems is otherwise negative unless stated above or in history of present illness.    Objective   Visit Vitals  /75 (BP Location: Left arm, Patient Position: Sitting, BP Cuff Size: Adult)   Pulse 87   Temp 36.3 °C (97.3 °F)   Ht 1.727 m (5' 8\")   Wt 59 kg (130 lb)   SpO2 96%   BMI 19.77 kg/m²   Smoking Status Former   BSA 1.68 m²     Physical Exam  Constitutional: Cachexia of chronic disease     General: not in acute distress.   HENT:      Head: Normocephalic and atraumatic.      Nose: Nose normal.   Eyes: Eyeglasses     Extraocular Movements: Extraocular movements intact.      Conjunctiva/sclera: Conjunctivae normal.   Cardiovascular: Heart murmur     Rate and Rhythm: Normal rate ,  No M/R/G  Pulmonary: Crackles rhonchi     Effort: Pulmonary effort is normal.      Breath sounds: Normal, Bilat Equal AE  Skin:     General: Skin is warm.   Neurological: Neuropathy     Mental Status: He is alert and oriented to person, place, and time.   Psychiatric:   Anxiety depression   Mood and Affect: Mood normal.         Behavior: Behavior normal.   Musculoskeletal polyarthritis  FROM in all extremitirs,  Joint-no swelling or tenderness    No visits with results within 4 Month(s) from this visit.   Latest known visit with results is:   Orders Only on 04/06/2023   Component Date Value Ref Range Status    [facility] Microalbumin/Creatinine* 04/06/2023 <3  0 - 30 mg MALB/gm CREAT Final    [facility] Microalbumin, Urine mg/L 04/06/2023 <5.0  mg/L Final    [facility] Creatinine, Urine mg/dl 04/06/2023 112.8  mg/dL Final    [facility] Leukocyte Esterase, U 04/06/2023 Small (A)  Negative Final    [facility] Bilirubin, U " 04/06/2023 Negative  Negative Final    [facility] Nitrite, U 04/06/2023 Negative  Negative Final    [facility] pH, U 04/06/2023 5.0  4.5 - 8.0 Final    [facility] Appearance, U 04/06/2023 Clear   Final    [facility] Bacteria, U 04/06/2023 Occasional   Final    [facility] Color, U 04/06/2023 Yellow   Final    [facility] Blood, U 04/06/2023 Negative  Negative Final    [facility] Squamous Epithelial Abimbola* 04/06/2023 <1  #/HPF Final    [facility] Glucose Qual, U 04/06/2023 Negative  Negative Final    [facility] Urobilinogen Qual, U 04/06/2023 <2.0 mg/dl  <2.0 mg/dl Final    [facility] WBC/HPF, U 04/06/2023 2  0 - 5 #/HPF Final    [facility] Specific Gravity, U 04/06/2023 1.015  1.001 - 1.035 Final    [facility] Protein, U 04/06/2023 Negative  Negative Final    [facility] RBC/HPF, U 04/06/2023 1  0 - 3 #/HPF Final    [facility] Ketones, U 04/06/2023 Negative  Negative Final    [facility] U MICRO 04/06/2023 Indicated   Final       Radiology: Reviewed imaging in powerchart.  No results found.      Charting was completed using voice recognition technology and may include unintended errors.

## 2023-08-25 NOTE — ASSESSMENT & PLAN NOTE
CKD and various stages of CKD and significance explained, CKD is very common and rising diagnosis among [place] adults. Main culprits for CKD etiology needs to be attended well. GFR values explained. Nephrotoxic agents has to be avoided, plenty of water consumption is always beneficial. Avoid NSAIDs, always check with MD about usage of OTC medications or any other Rx given by other providers. GFR less then 10 usually will need renal replacement therapy. Episodic check on renal functions needed. Always ask MD about GFR at next visit. Avoid dehydration.     Constricted

## 2023-08-25 NOTE — ASSESSMENT & PLAN NOTE
Physical therapy Occupational Therapy ambulatory problem because of the chronic lung disease heart disease and arthritis advised lift chair

## 2023-09-05 ENCOUNTER — TELEPHONE (OUTPATIENT)
Dept: PRIMARY CARE | Facility: CLINIC | Age: 68
End: 2023-09-05
Payer: COMMERCIAL

## 2023-09-05 DIAGNOSIS — M85.80 OSTEOPENIA, UNSPECIFIED LOCATION: ICD-10-CM

## 2023-09-05 RX ORDER — FERROUS SULFATE, DRIED 160(50) MG
TABLET, EXTENDED RELEASE ORAL
Qty: 180 TABLET | Refills: 1 | Status: SHIPPED | OUTPATIENT
Start: 2023-09-05 | End: 2023-09-22 | Stop reason: ALTCHOICE

## 2023-09-05 NOTE — TELEPHONE ENCOUNTER
Spoke to pt regarding bone density scan. Pt expressed understanding and agreeable to have medication sent to pharmacy. Please review.

## 2023-09-15 ENCOUNTER — TELEPHONE (OUTPATIENT)
Dept: PRIMARY CARE | Facility: CLINIC | Age: 68
End: 2023-09-15
Payer: COMMERCIAL

## 2023-09-15 DIAGNOSIS — E11.9 DIET-CONTROLLED DIABETES MELLITUS (MULTI): ICD-10-CM

## 2023-09-15 RX ORDER — DEXTROSE 4 G
TABLET,CHEWABLE ORAL
Qty: 1 EACH | Refills: 0 | Status: SHIPPED | OUTPATIENT
Start: 2023-09-15 | End: 2023-09-15 | Stop reason: SDUPTHER

## 2023-09-15 RX ORDER — DEXTROSE 4 G
TABLET,CHEWABLE ORAL
Qty: 1 EACH | Refills: 0 | Status: SHIPPED | OUTPATIENT
Start: 2023-09-15 | End: 2023-11-06

## 2023-09-19 ENCOUNTER — PATIENT OUTREACH (OUTPATIENT)
Dept: CARE COORDINATION | Facility: CLINIC | Age: 68
End: 2023-09-19
Payer: COMMERCIAL

## 2023-09-19 NOTE — PROGRESS NOTES
Unable to reach patient for 90 days post discharge follow up call. M with call back number for patient to call if needed If no voicemail available call attempts x 2 were made to contact the patient to assist with any questions or concerns patient may have.

## 2023-09-22 ENCOUNTER — OFFICE VISIT (OUTPATIENT)
Dept: PRIMARY CARE | Facility: CLINIC | Age: 68
End: 2023-09-22
Payer: COMMERCIAL

## 2023-09-22 VITALS
HEART RATE: 82 BPM | WEIGHT: 125.6 LBS | BODY MASS INDEX: 19.04 KG/M2 | SYSTOLIC BLOOD PRESSURE: 110 MMHG | DIASTOLIC BLOOD PRESSURE: 67 MMHG | OXYGEN SATURATION: 97 % | HEIGHT: 68 IN | TEMPERATURE: 97 F

## 2023-09-22 DIAGNOSIS — E11.9 DIET-CONTROLLED DIABETES MELLITUS (MULTI): ICD-10-CM

## 2023-09-22 DIAGNOSIS — M10.012 IDIOPATHIC GOUT OF LEFT SHOULDER, UNSPECIFIED CHRONICITY: ICD-10-CM

## 2023-09-22 DIAGNOSIS — I50.82 BIVENTRICULAR CONGESTIVE HEART FAILURE (MULTI): Primary | ICD-10-CM

## 2023-09-22 DIAGNOSIS — J43.1 PANLOBULAR EMPHYSEMA (MULTI): ICD-10-CM

## 2023-09-22 DIAGNOSIS — I73.9 PAD (PERIPHERAL ARTERY DISEASE) (CMS-HCC): ICD-10-CM

## 2023-09-22 PROBLEM — M13.0 POLYARTHRITIS: Status: RESOLVED | Noted: 2023-08-25 | Resolved: 2023-09-22

## 2023-09-22 PROBLEM — I25.119 ATHEROSCLEROSIS OF NATIVE CORONARY ARTERY OF NATIVE HEART WITH ANGINA PECTORIS (CMS-HCC): Status: RESOLVED | Noted: 2023-01-01 | Resolved: 2023-01-01

## 2023-09-22 LAB
NON-UH HIE BASO COUNT: 0.04 X1000 (ref 0–0.2)
NON-UH HIE BASOS %: 0.8 %
NON-UH HIE BUN/CREAT RATIO: 12.9
NON-UH HIE BUN: 9 MG/DL (ref 9–23)
NON-UH HIE CALCIUM: 9.4 MG/DL (ref 8.7–10.4)
NON-UH HIE CALCULATED OSMOLALITY: 284 MOSM/KG (ref 275–295)
NON-UH HIE CHLORIDE: 107 MMOL/L (ref 98–107)
NON-UH HIE CO2, VENOUS: 26 MMOL/L (ref 20–31)
NON-UH HIE CREATININE: 0.7 MG/DL (ref 0.6–1.1)
NON-UH HIE DIFF?: NO
NON-UH HIE EOS COUNT: 0.03 X1000 (ref 0–0.5)
NON-UH HIE EOSIN %: 0.5 %
NON-UH HIE GFR AA: >60
NON-UH HIE GLOMERULAR FILTRATION RATE: >60 ML/MIN/1.73M?
NON-UH HIE GLUCOSE: 96 MG/DL (ref 74–106)
NON-UH HIE HCT: 36.7 % (ref 41–52)
NON-UH HIE HGB A1C: 5.4 %
NON-UH HIE HGB: 12 G/DL (ref 13.5–17.5)
NON-UH HIE INSTR WBC: 5.9
NON-UH HIE K: 4 MMOL/L (ref 3.5–5.1)
NON-UH HIE LYMPH %: 19 %
NON-UH HIE LYMPH COUNT: 1.12 X1000 (ref 1.2–4.8)
NON-UH HIE MCH: 27.7 PG (ref 27–34)
NON-UH HIE MCHC: 32.7 G/DL (ref 32–37)
NON-UH HIE MCV: 84.5 FL (ref 80–100)
NON-UH HIE MONO %: 6.9 %
NON-UH HIE MONO COUNT: 0.4 X1000 (ref 0.1–1)
NON-UH HIE MPV: 7.6 FL (ref 7.4–10.4)
NON-UH HIE NA: 143 MMOL/L (ref 135–145)
NON-UH HIE NEUTROPHIL %: 72.9 %
NON-UH HIE NEUTROPHIL COUNT (ANC): 4.29 X1000 (ref 1.4–8.8)
NON-UH HIE NUCLEATED RBC: 0 /100WBC
NON-UH HIE PLATELET: 299 X10 (ref 150–450)
NON-UH HIE RBC: 4.34 X10 (ref 4.7–6.1)
NON-UH HIE RDW: 16.1 % (ref 11.5–14.5)
NON-UH HIE RED BLOOD CELL MORPHOLOGY: ABNORMAL
NON-UH HIE TSH: 0.36 UIU/ML (ref 0.55–4.78)
NON-UH HIE WBC: 5.9 X10 (ref 4.5–11)

## 2023-09-22 PROCEDURE — 1160F RVW MEDS BY RX/DR IN RCRD: CPT | Performed by: INTERNAL MEDICINE

## 2023-09-22 PROCEDURE — 3078F DIAST BP <80 MM HG: CPT | Performed by: INTERNAL MEDICINE

## 2023-09-22 PROCEDURE — 1159F MED LIST DOCD IN RCRD: CPT | Performed by: INTERNAL MEDICINE

## 2023-09-22 PROCEDURE — 4010F ACE/ARB THERAPY RXD/TAKEN: CPT | Performed by: INTERNAL MEDICINE

## 2023-09-22 PROCEDURE — 1125F AMNT PAIN NOTED PAIN PRSNT: CPT | Performed by: INTERNAL MEDICINE

## 2023-09-22 PROCEDURE — 1036F TOBACCO NON-USER: CPT | Performed by: INTERNAL MEDICINE

## 2023-09-22 PROCEDURE — 3074F SYST BP LT 130 MM HG: CPT | Performed by: INTERNAL MEDICINE

## 2023-09-22 PROCEDURE — 99213 OFFICE O/P EST LOW 20 MIN: CPT | Performed by: INTERNAL MEDICINE

## 2023-09-22 RX ORDER — FEBUXOSTAT 40 MG/1
TABLET, FILM COATED ORAL
COMMUNITY
End: 2024-04-12 | Stop reason: WASHOUT

## 2023-09-22 RX ORDER — LOVASTATIN 40 MG/1
40 TABLET ORAL NIGHTLY
COMMUNITY
End: 2024-01-05 | Stop reason: SDUPTHER

## 2023-09-22 NOTE — PROGRESS NOTES
Subjective   Patient ID: Brian Low is a 68 y.o. male who presents for Follow-up (1 month ).    Assessment/Plan     Problem List Items Addressed This Visit       Gout     Allopurinol hydration check uric acid         Chronic obstructive pulmonary disease (CMS/[facility])     Pt has moderate to severe COPD. It is progressive disease, use of MDI and proper technique discussed, rinse mouth after inhaled corticosteroids. Notify if progressive dyspnea or cough or lethargy, monitor oxygen saturation if possible with home based pulse oximetry. Avoid hospitalization and call us if any flare ups are about to happen, be sure that annual flu vaccine are uptodate and review need for pneumococcal vaccine. Light to moderate low impact exercises are very helpful and deep breathing  exercises are beneficial in chronic lung diseases.          PAD (peripheral artery disease) (CMS/[facility])     Vascular evaluation         Biventricular congestive heart failure (CMS/HCC) - Primary     Pt has CHF, diastolic or systolic were specified, usually three times  a week weight monitoring, salt restriction up to 2 GM Na diet, fluid restriction and continuation of therapy as recommended to be continued. HOB can be kept somewhat elevated at night. Any dyspnea or more then 5 lb weight gain or leg swelling need to be notified, please call if any of above sympts happen and pt will be addressed if possible on outpatient setting. Light exercises are always beneficial with fresh air and deep breathing exercises. Please follow up with us as directed.          Diet-controlled diabetes mellitus (CMS/[facility])     Dietitian ophthalmology deepa          Patient was evaluated today, problem list was reviewed, problems and concerns addressed, Rx list reviewed and updated, lab and tests were noted and reviewed. Life style changes were discussed, always it works better if we eat plant based diet and plenty of fibres and roughage. Consume adequate amount of water and avoid  alcohol, light to moderate physical activities and stress reduction are always beneficial for ongoing physical well being. Do not forget to have 6 to 7 hours of sleep regularly and avoid late night pete screen exposure.   HPI 68-year-old patient who had a complex medical problem history of COPD CAD PAD CHF multiple hospitalizations seen by vascular pulmonary GI and cardiology service    Chronic pain continue Norco    COPD oxygen albuterol flu pneumonia COVID-19 vaccine Theophylline    Gouty arthritis osteoporosis Fosamax allopurinol    Heart disease aspirin    Hypertension Catapres    SVT Cartia    Gout gout education    Neuropathy gabapentin    Hyperlipidemia lovastatin    Proteinuria Diovan    SVT Cardizem and Xarelto    Follow-up  Past Medical History:   Diagnosis Date    Abnormal level of blood mineral 12/29/2017    Low magnesium levels    Anorexia 04/19/2021    Anorexia    Carpal tunnel syndrome, right upper limb 03/17/2022    Carpal tunnel syndrome of right wrist    Effusion, unspecified ankle 07/02/2019    Ankle edema    Encounter for immunization 01/25/2022    Encounter for immunization    Encounter for screening for malignant neoplasm of colon 01/25/2022    Colon cancer screening    Encounter for screening for malignant neoplasm of rectum 01/25/2022    Screening for rectal cancer    Essential (primary) hypertension 09/25/2020    Benign essential hypertension    Heart failure, unspecified (CMS/HCC) 12/28/2017    CHF (NYHA class III, ACC/AHA stage C)    Hydrocele, unspecified 06/06/2016    Hydrocele, left    Hydrocele, unspecified 09/28/2015    Left hydrocele    Irritable bowel syndrome with constipation 10/06/2022    Irritable bowel syndrome with constipation    Left lower quadrant pain 11/09/2015    Inguinal pain, left    Other conditions influencing health status 02/09/2016    History of cough    Other long term (current) drug therapy 09/25/2020    Long term use of drug    Other specified postprocedural  states 07/16/2020    H/O right wrist surgery    Other symptoms and signs involving the musculoskeletal system 01/25/2022    Leg weakness    Pain in left hip 02/18/2020    Left hip pain    Personal history of colonic polyps 07/09/2018    History of colonic polyps    Personal history of diseases of the blood and blood-forming organs and certain disorders involving the immune mechanism 08/17/2020    History of anemia    Personal history of other (healed) physical injury and trauma 01/25/2022    History of trauma    Personal history of other diseases of male genital organs 12/04/2019    History of impotence    Personal history of other diseases of the circulatory system 08/17/2020    History of hypertension    Personal history of other diseases of the digestive system 08/25/2022    History of constipation    Personal history of other diseases of the musculoskeletal system and connective tissue 09/25/2020    History of polymyalgia rheumatica    Personal history of other diseases of the nervous system and sense organs 04/12/2017    History of otitis media    Personal history of other endocrine, nutritional and metabolic disease 04/15/2022    History of diabetes mellitus    Personal history of other endocrine, nutritional and metabolic disease 01/25/2022    History of vitamin D deficiency    Personal history of other endocrine, nutritional and metabolic disease 08/17/2020    History of hyperlipidemia    Personal history of other specified conditions 11/09/2015    History of abdominal pain    Radiculopathy, cervical region 07/16/2020    Cervical radiculopathy    Radiculopathy, lumbar region 09/25/2020    Chronic lumbar radiculopathy    Rash and other nonspecific skin eruption 01/25/2022    Skin rash    Right lower quadrant pain 06/23/2014    Inguinal pain of both sides    Scrotal pain 09/17/2015    Scrotal pain    Spinal stenosis, lumbosacral region 01/25/2022    Lumbosacral stenosis with neurogenic claudication     Past  Surgical History:   Procedure Laterality Date    CARDIAC SURGERY  05/08/2014    Heart Surgery    OTHER SURGICAL HISTORY  08/25/2022    Leg surgery     Allergies   Allergen Reactions    Strawberry Unknown     Current Outpatient Medications   Medication Sig Dispense Refill    albuterol 2.5 mg /3 mL (0.083 %) nebulizer solution Take 3 mL (2.5 mg) by nebulization.      alendronate (Fosamax) 70 mg tablet Take by mouth.      aspirin 81 mg EC tablet Take 1 tablet (81 mg) by mouth once daily.      blood-glucose meter (OneTouch Verio Flex meter) misc Testing once a day 1 each 0    calcium carbonate-vitamin D3 (Os-Kathleen 500 + D3) 500 mg-5 mcg (200 unit) tablet Take 2 tablets by mouth daily 180 tablet 1    Cartia  mg 24 hr capsule Take 1 capsule (180 mg) by mouth 2 times a day.      cloNIDine (Catapres) 0.1 mg tablet Take 1 tablet (0.1 mg) by mouth 2 times a day.      fluticasone (Flonase) 50 mcg/actuation nasal spray Administer 1 spray into each nostril once daily. Shake gently. Before first use, prime pump. After use, clean tip and replace cap. 16 g 3    gabapentin (Neurontin) 100 mg capsule Take 1 capsule (100 mg) by mouth 2 times a day.      montelukast (Singulair) 10 mg tablet Take 1 tablet (10 mg) by mouth once daily. 90 tablet 3    nitroglycerin (Nitrostat) 0.4 mg SL tablet Place 1 tablet (0.4 mg) under the tongue every 5 minutes if needed for chest pain. 30 tablet 3    Norco 5-325 mg tablet 1 tabs, ORAL, BID, Refill(s) 0, Date: 06/20/2023 17:23:00 EDT      omeprazole (PriLOSEC) 20 mg DR capsule Take 1 capsule (20 mg) by mouth once daily.      predniSONE (Deltasone) 5 mg tablet Take 1 tablet (5 mg) by mouth once daily. 90 tablet 3    roflumilast (Daliresp) 500 mcg tablet Take 1 tablet (500 mcg) by mouth once daily. 90 tablet 3    valsartan (Diovan) 80 mg tablet Take 1 tablet (80 mg) by mouth once daily.      Xarelto 2.5 mg tablet Take 1 tablet (2.5 mg) by mouth in the morning and 1 tablet (2.5 mg) before bedtime.  180 tablet 3     No current facility-administered medications for this visit.     No family history on file.  Social History     Socioeconomic History    Marital status: Single     Spouse name: None    Number of children: None    Years of education: None    Highest education level: None   Occupational History    None   Tobacco Use    Smoking status: Former     Types: Cigarettes    Smokeless tobacco: Former   Substance and Sexual Activity    Alcohol use: Not Currently    Drug use: Not Currently    Sexual activity: None   Other Topics Concern    None   Social History Narrative    None     Social Determinants of Health     Financial Resource Strain: Not on file   Food Insecurity: Not on file   Transportation Needs: Not on file   Physical Activity: Not on file   Stress: Not on file   Social Connections: Not on file   Intimate Partner Violence: Not on file   Housing Stability: Not on file     Immunization History   Administered Date(s) Administered    Flu vaccine (IIV4), preservative free *Check age/dose* 10/22/2014, 10/03/2018    Flu vaccine, quadrivalent, high-dose, preservative free, age 65y+ (FLUZONE) 10/22/2021, 10/03/2022, 09/02/2023    Hepatitis B vaccine, adult (RECOMBIVAX, ENGERIX) 11/13/2006, 12/11/2006    HiB, unspecified 11/21/2008    Influenza, Unspecified 11/16/2007, 10/10/2008, 01/05/2010, 09/06/2019, 09/25/2020    Influenza, injectable, quadrivalent 10/22/2021    Influenza, seasonal, injectable 09/06/2011, 09/27/2012, 09/17/2013, 10/22/2014, 10/22/2014, 08/09/2017, 09/02/2023    Influenza, seasonal, injectable, preservative free 11/09/2015    Juan SARS-CoV-2 Vaccination 03/12/2021, 11/03/2021    PPD Test 04/30/2007, 02/12/2008    Pfizer COVID-19 vaccine, bivalent, age 12 years and older (30 mcg/0.3 mL) 10/03/2022    Pfizer Gray Cap SARS-CoV-2 04/11/2022    Pneumococcal polysaccharide vaccine, 23-valent, age 2 years and older (PNEUMOVAX 23) 07/20/2005, 11/21/2008, 06/08/2012, 02/13/2015    RSV, 60 Years  "And Older (AREXVY) 09/02/2023    Td vaccine, age 7 years and older (TDVAX) 06/08/2012    Tdap vaccine, age 7 year and older (BOOSTRIX) 12/10/2008       Review of Systems  Review of systems is otherwise negative unless stated above or in history of present illness.    Objective   Visit Vitals  /67 (BP Location: Left arm, Patient Position: Sitting, BP Cuff Size: Adult)   Pulse 82   Temp 36.1 °C (97 °F)   Ht 1.727 m (5' 8\")   Wt 57 kg (125 lb 9.6 oz)   SpO2 97%   BMI 19.10 kg/m²   Smoking Status Former   BSA 1.65 m²     Physical Exam  Constitutional:       General: not in acute distress.   HENT:      Head: Normocephalic and atraumatic.      Nose: Nose normal.   Eyes:      Extraocular Movements: Extraocular movements intact.      Conjunctiva/sclera: Conjunctivae normal.   Cardiovascular:      Rate and Rhythm: Normal rate ,  No M/R/G  Pulmonary:      Effort: Pulmonary effort is normal.      Breath sounds: Normal, Bilat Equal AE  Skin:     General: Skin is warm.   Neurological:      Mental Status: He is alert and oriented to person, place, and time.   Psychiatric:         Mood and Affect: Mood normal.         Behavior: Behavior normal.   Musculoskeletal   FROM in all extremitirs,  Joint-no swelling or tenderness    No visits with results within 4 Month(s) from this visit.   Latest known visit with results is:   Orders Only on 04/06/2023   Component Date Value Ref Range Status    [facility] Microalbumin/Creatinine* 04/06/2023 <3  0 - 30 mg MALB/gm CREAT Final    [facility] Microalbumin, Urine mg/L 04/06/2023 <5.0  mg/L Final    [facility] Creatinine, Urine mg/dl 04/06/2023 112.8  mg/dL Final    [facility] Leukocyte Esterase, U 04/06/2023 Small (A)  Negative Final    [facility] Bilirubin, U 04/06/2023 Negative  Negative Final    [facility] Nitrite, U 04/06/2023 Negative  Negative Final    [facility] pH, U 04/06/2023 5.0  4.5 - 8.0 Final    [facility] Appearance, U 04/06/2023 Clear   Final    [facility] Bacteria, U " 04/06/2023 Occasional   Final    [facility] Color, U 04/06/2023 Yellow   Final    [facility] Blood, U 04/06/2023 Negative  Negative Final    [facility] Squamous Epithelial Abimbola* 04/06/2023 <1  #/HPF Final    [facility] Glucose Qual, U 04/06/2023 Negative  Negative Final    [facility] Urobilinogen Qual, U 04/06/2023 <2.0 mg/dl  <2.0 mg/dl Final    [facility] WBC/HPF, U 04/06/2023 2  0 - 5 #/HPF Final    [facility] Specific Gravity, U 04/06/2023 1.015  1.001 - 1.035 Final    [facility] Protein, U 04/06/2023 Negative  Negative Final    [facility] RBC/HPF, U 04/06/2023 1  0 - 3 #/HPF Final    [facility] Ketones, U 04/06/2023 Negative  Negative Final    [facility] U MICRO 04/06/2023 Indicated   Final       Radiology: Reviewed imaging in powerchart.  XR DEXA bone density    Result Date: 8/31/2023  EXAM: DEXA BONE DENSITY SCREEN  8/31/2023 11:44 AM CDT HISTORY: M81.0 Osteoporosis screening. TECHNOLOGIST NOTES: WHAT SYMPTOMS ARE YOU EXPERIENCING?; SCREENING  COMPARISON: 4/2/2019 SCAN PARAMETERS: Dual energy bone densitometry lumbar spine and right hip. FINDINGS: Total bone density of the L1-L4 is 1.700 grams per square centimeters, and T-score being 5.5, indicating that this patient has normal bone mineral density.  BMD change: +1.6%. Total bone mineral density of the right femoral neck is 0.849 grams per square centimeters, and T-score being -0.6, indicating that this patient has normal bone mineral density. BMD change: -2.5%. Total bone mineral density of the right hip is 0.831 grams per square centimeters, and T-score being -1.3, indicating that this patient has osteopenia. BMD change: -12.4%. FRAX score: 10 year risk major osteoporotic fracture 6.0%. 10 year risk hip fracture 0.6%. IMPRESSION: Osteopenia. Electronically signed by:  Rik Gerber MD  9/2/2023 5:27 PM CDT Workstation: [id number] Technologist:  IC Dictated By:   RIK GERBER MD Signed By:     RIK GERBER MD Signed Out:    09/02/23  18:27:07        Charting was completed using voice recognition technology and may include unintended errors.

## 2023-09-29 ENCOUNTER — TELEPHONE (OUTPATIENT)
Dept: PRIMARY CARE | Facility: CLINIC | Age: 68
End: 2023-09-29
Payer: COMMERCIAL

## 2023-09-29 DIAGNOSIS — E03.9 ACQUIRED HYPOTHYROIDISM: ICD-10-CM

## 2023-09-29 DIAGNOSIS — E03.9 HYPOTHYROIDISM, UNSPECIFIED TYPE: ICD-10-CM

## 2023-09-29 DIAGNOSIS — R79.89 LOW TSH LEVEL: ICD-10-CM

## 2023-10-03 ENCOUNTER — LAB (OUTPATIENT)
Dept: LAB | Facility: LAB | Age: 68
End: 2023-10-03
Payer: COMMERCIAL

## 2023-10-03 DIAGNOSIS — E03.9 ACQUIRED HYPOTHYROIDISM: ICD-10-CM

## 2023-10-03 DIAGNOSIS — E03.9 HYPOTHYROIDISM, UNSPECIFIED TYPE: ICD-10-CM

## 2023-10-03 DIAGNOSIS — R79.89 LOW TSH LEVEL: ICD-10-CM

## 2023-10-03 PROCEDURE — 36415 COLL VENOUS BLD VENIPUNCTURE: CPT

## 2023-10-04 LAB — T4 FREE SERPL-MCNC: 1.15 NG/DL (ref 0.78–1.48)

## 2023-10-05 ENCOUNTER — TELEPHONE (OUTPATIENT)
Dept: PRIMARY CARE | Facility: CLINIC | Age: 68
End: 2023-10-05
Payer: COMMERCIAL

## 2023-10-05 LAB
T3FREE SERPL-MCNC: 1.9 PG/ML (ref 2.3–4.2)
TSH SERPL-ACNC: 0.46 MIU/L (ref 0.44–3.98)

## 2023-11-05 DIAGNOSIS — E11.9 DIET-CONTROLLED DIABETES MELLITUS (MULTI): ICD-10-CM

## 2023-11-06 ENCOUNTER — OFFICE VISIT (OUTPATIENT)
Dept: PRIMARY CARE | Facility: CLINIC | Age: 68
End: 2023-11-06
Payer: COMMERCIAL

## 2023-11-06 VITALS
TEMPERATURE: 97.1 F | DIASTOLIC BLOOD PRESSURE: 75 MMHG | BODY MASS INDEX: 18.94 KG/M2 | OXYGEN SATURATION: 93 % | HEIGHT: 68 IN | HEART RATE: 72 BPM | WEIGHT: 125 LBS | SYSTOLIC BLOOD PRESSURE: 130 MMHG

## 2023-11-06 DIAGNOSIS — I73.9 PAD (PERIPHERAL ARTERY DISEASE) (CMS-HCC): ICD-10-CM

## 2023-11-06 DIAGNOSIS — R10.9 CHRONIC ABDOMINAL PAIN: Primary | ICD-10-CM

## 2023-11-06 DIAGNOSIS — E11.9 DIET-CONTROLLED DIABETES MELLITUS (MULTI): ICD-10-CM

## 2023-11-06 DIAGNOSIS — I50.82 BIVENTRICULAR CONGESTIVE HEART FAILURE (MULTI): ICD-10-CM

## 2023-11-06 DIAGNOSIS — Z13.6 SCREENING FOR ABDOMINAL AORTIC ANEURYSM: ICD-10-CM

## 2023-11-06 DIAGNOSIS — Z12.2 ENCOUNTER FOR SCREENING FOR LUNG CANCER: ICD-10-CM

## 2023-11-06 DIAGNOSIS — E11.9 TYPE 2 DIABETES MELLITUS WITHOUT COMPLICATION, WITHOUT LONG-TERM CURRENT USE OF INSULIN (MULTI): ICD-10-CM

## 2023-11-06 DIAGNOSIS — Z87.891 EX-CIGARETTE SMOKER: ICD-10-CM

## 2023-11-06 DIAGNOSIS — J43.2 CENTRILOBULAR EMPHYSEMA (MULTI): ICD-10-CM

## 2023-11-06 DIAGNOSIS — G89.29 CHRONIC ABDOMINAL PAIN: Primary | ICD-10-CM

## 2023-11-06 DIAGNOSIS — I10 HYPERTENSION, UNSPECIFIED TYPE: ICD-10-CM

## 2023-11-06 PROCEDURE — 3078F DIAST BP <80 MM HG: CPT | Performed by: INTERNAL MEDICINE

## 2023-11-06 PROCEDURE — 4010F ACE/ARB THERAPY RXD/TAKEN: CPT | Performed by: INTERNAL MEDICINE

## 2023-11-06 PROCEDURE — 1160F RVW MEDS BY RX/DR IN RCRD: CPT | Performed by: INTERNAL MEDICINE

## 2023-11-06 PROCEDURE — 99214 OFFICE O/P EST MOD 30 MIN: CPT | Performed by: INTERNAL MEDICINE

## 2023-11-06 PROCEDURE — 1159F MED LIST DOCD IN RCRD: CPT | Performed by: INTERNAL MEDICINE

## 2023-11-06 PROCEDURE — 1125F AMNT PAIN NOTED PAIN PRSNT: CPT | Performed by: INTERNAL MEDICINE

## 2023-11-06 PROCEDURE — 3075F SYST BP GE 130 - 139MM HG: CPT | Performed by: INTERNAL MEDICINE

## 2023-11-06 PROCEDURE — 1036F TOBACCO NON-USER: CPT | Performed by: INTERNAL MEDICINE

## 2023-11-06 RX ORDER — DEXTROSE 4 G
TABLET,CHEWABLE ORAL
Qty: 1 EACH | Refills: 0 | Status: SHIPPED | OUTPATIENT
Start: 2023-11-06 | End: 2024-04-12 | Stop reason: WASHOUT

## 2023-11-06 RX ORDER — FERROUS SULFATE, DRIED 160(50) MG
TABLET, EXTENDED RELEASE ORAL
COMMUNITY
Start: 2023-09-05 | End: 2024-02-27 | Stop reason: SDUPTHER

## 2023-11-06 NOTE — PROGRESS NOTES
Subjective   Patient ID: Brian Low is a 68 y.o. male who presents for Follow-up (Follow up /Left side pain- lower abd and stomach ).    Assessment/Plan     Problem List Items Addressed This Visit       Chronic obstructive pulmonary disease (CMS/[facility])     CAT scan of the lung refer to pulmonary Dr. Rosado         PAD (peripheral artery disease) (CMS/[facility])     Sent for ultrasound of the aorta and PVR study         Biventricular congestive heart failure (CMS/[facility]) - Primary     2D echo refer patient to Dr. Armas         Type 2 diabetes mellitus without complication, without long-term current use of insulin (CMS/[facility])     Dietitian evaluation         Relevant Medications    blood sugar diagnostic strip    Encounter for screening for lung cancer    Relevant Orders    CT lung screening low dose    Hypertension    Relevant Orders    CT cardiac scoring wo IV contrast    Ex-cigarette smoker    Relevant Orders    CT lung screening low dose   Patient was evaluated today, problem list was reviewed, problems and concerns addressed, Rx list reviewed and updated, lab and tests were noted and reviewed. Life style changes were discussed, always it works better if we eat plant based diet and plenty of fibres and roughage. Consume adequate amount of water and avoid alcohol, light to moderate physical activities and stress reduction are always beneficial for ongoing physical well being. Do not forget to have 6 to 7 hours of sleep regularly and avoid late night pete screen exposure.      HPI  68-year-old patient had a COPD PAD CAD CHF complaining of acute on chronic abdominal pain with or without food possible hernia superior mesenteric artery ischemia gastritis cannot be rule out refer the patient to vascular GI pulmonary and cardiology service    COPD continue Trelegy and inhaler CAT scan of the lung    SVT continue Cardizem    Hypertension:    Gouty arthritis Uloric    Neuropathy gabapentin    Hyperlipidemia Mevacor    SVT continue  beta-blocker plus Xarelto    PAD CAD refer patient for ultrasound of the carotid heart and aorta    Chronic abdominal pain refer to Dr. Sarkar for further evaluation in clinical judgment is ischemic colitis from superior artery stenosis from the PAD  Past Medical History:   Diagnosis Date    Abnormal level of blood mineral 12/29/2017    Low magnesium levels    Anorexia 04/19/2021    Anorexia    Carpal tunnel syndrome, right upper limb 03/17/2022    Carpal tunnel syndrome of right wrist    Effusion, unspecified ankle 07/02/2019    Ankle edema    Encounter for immunization 01/25/2022    Encounter for immunization    Encounter for screening for malignant neoplasm of colon 01/25/2022    Colon cancer screening    Encounter for screening for malignant neoplasm of rectum 01/25/2022    Screening for rectal cancer    Essential (primary) hypertension 09/25/2020    Benign essential hypertension    Heart failure, unspecified (CMS/HCC) 12/28/2017    CHF (NYHA class III, ACC/AHA stage C)    Hydrocele, unspecified 06/06/2016    Hydrocele, left    Hydrocele, unspecified 09/28/2015    Left hydrocele    Irritable bowel syndrome with constipation 10/06/2022    Irritable bowel syndrome with constipation    Left lower quadrant pain 11/09/2015    Inguinal pain, left    Other conditions influencing health status 02/09/2016    History of cough    Other long term (current) drug therapy 09/25/2020    Long term use of drug    Other specified postprocedural states 07/16/2020    H/O right wrist surgery    Other symptoms and signs involving the musculoskeletal system 01/25/2022    Leg weakness    Pain in left hip 02/18/2020    Left hip pain    Personal history of colonic polyps 07/09/2018    History of colonic polyps    Personal history of diseases of the blood and blood-forming organs and certain disorders involving the immune mechanism 08/17/2020    History of anemia    Personal history of other (healed) physical injury and trauma 01/25/2022     History of trauma    Personal history of other diseases of male genital organs 12/04/2019    History of impotence    Personal history of other diseases of the circulatory system 08/17/2020    History of hypertension    Personal history of other diseases of the digestive system 08/25/2022    History of constipation    Personal history of other diseases of the musculoskeletal system and connective tissue 09/25/2020    History of polymyalgia rheumatica    Personal history of other diseases of the nervous system and sense organs 04/12/2017    History of otitis media    Personal history of other endocrine, nutritional and metabolic disease 04/15/2022    History of diabetes mellitus    Personal history of other endocrine, nutritional and metabolic disease 01/25/2022    History of vitamin D deficiency    Personal history of other endocrine, nutritional and metabolic disease 08/17/2020    History of hyperlipidemia    Personal history of other specified conditions 11/09/2015    History of abdominal pain    Radiculopathy, cervical region 07/16/2020    Cervical radiculopathy    Radiculopathy, lumbar region 09/25/2020    Chronic lumbar radiculopathy    Rash and other nonspecific skin eruption 01/25/2022    Skin rash    Right lower quadrant pain 06/23/2014    Inguinal pain of both sides    Scrotal pain 09/17/2015    Scrotal pain    Spinal stenosis, lumbosacral region 01/25/2022    Lumbosacral stenosis with neurogenic claudication     Past Surgical History:   Procedure Laterality Date    CARDIAC SURGERY  05/08/2014    Heart Surgery    OTHER SURGICAL HISTORY  08/25/2022    Leg surgery     Allergies   Allergen Reactions    Strawberry Unknown     Current Outpatient Medications   Medication Sig Dispense Refill    calcium carbonate-vitamin D3 500 mg-5 mcg (200 unit) tablet Take by mouth.      albuterol 2.5 mg /3 mL (0.083 %) nebulizer solution Take 3 mL (2.5 mg) by nebulization.      alendronate (Fosamax) 70 mg tablet Take by  mouth.      aspirin 81 mg EC tablet Take 1 tablet (81 mg) by mouth once daily.      blood sugar diagnostic strip Testing once a day 100 strip 3    blood-glucose meter (OneTouch Verio Flex meter) misc Testing once a day 1 each 0    Cartia  mg 24 hr capsule Take 1 capsule (180 mg) by mouth 2 times a day.      cloNIDine (Catapres) 0.1 mg tablet Take 1 tablet (0.1 mg) by mouth 2 times a day.      febuxostat (Uloric) 40 mg tablet Take by mouth.      fluticasone (Flonase) 50 mcg/actuation nasal spray Administer 1 spray into each nostril once daily. Shake gently. Before first use, prime pump. After use, clean tip and replace cap. 16 g 3    gabapentin (Neurontin) 100 mg capsule Take 1 capsule (100 mg) by mouth 2 times a day.      lovastatin (Mevacor) 40 mg tablet Take 1 tablet (40 mg) by mouth once daily at bedtime.      montelukast (Singulair) 10 mg tablet Take 1 tablet (10 mg) by mouth once daily. 90 tablet 3    nitroglycerin (Nitrostat) 0.4 mg SL tablet Place 1 tablet (0.4 mg) under the tongue every 5 minutes if needed for chest pain. 30 tablet 3    Norco 5-325 mg tablet 1 tabs, ORAL, BID, Refill(s) 0, Date: 06/20/2023 17:23:00 EDT      omeprazole (PriLOSEC) 20 mg DR capsule Take 1 capsule (20 mg) by mouth once daily.      predniSONE (Deltasone) 5 mg tablet Take 1 tablet (5 mg) by mouth once daily. 90 tablet 3    roflumilast (Daliresp) 500 mcg tablet Take 1 tablet (500 mcg) by mouth once daily. 90 tablet 3    valsartan (Diovan) 80 mg tablet Take 1 tablet (80 mg) by mouth once daily.      Xarelto 2.5 mg tablet Take 1 tablet (2.5 mg) by mouth in the morning and 1 tablet (2.5 mg) before bedtime. 180 tablet 3     No current facility-administered medications for this visit.     No family history on file.  Social History     Socioeconomic History    Marital status: Single     Spouse name: None    Number of children: None    Years of education: None    Highest education level: None   Occupational History    None   Tobacco  Use    Smoking status: Former     Types: Cigarettes    Smokeless tobacco: Former   Substance and Sexual Activity    Alcohol use: Not Currently    Drug use: Not Currently    Sexual activity: None   Other Topics Concern    None   Social History Narrative    None     Social Determinants of Health     Financial Resource Strain: Not on file   Food Insecurity: Not on file   Transportation Needs: Not on file   Physical Activity: Not on file   Stress: Not on file   Social Connections: Not on file   Intimate Partner Violence: Not on file   Housing Stability: Not on file     Immunization History   Administered Date(s) Administered    Flu vaccine (IIV4), preservative free *Check age/dose* 10/22/2014, 10/03/2018    Flu vaccine, quadrivalent, high-dose, preservative free, age 65y+ (FLUZONE) 10/22/2021, 10/03/2022, 09/02/2023    Hepatitis B vaccine, adult (RECOMBIVAX, ENGERIX) 11/13/2006, 12/11/2006    HiB, unspecified 11/21/2008    Influenza, Unspecified 11/16/2007, 10/10/2008, 01/05/2010, 09/06/2019, 09/25/2020    Influenza, injectable, quadrivalent 10/22/2021    Influenza, seasonal, injectable 09/06/2011, 09/27/2012, 09/17/2013, 10/22/2014, 10/22/2014, 08/09/2017, 09/02/2023    Influenza, seasonal, injectable, preservative free 11/09/2015    Juan SARS-CoV-2 Vaccination 03/12/2021, 11/03/2021    PPD Test 04/30/2007, 02/12/2008    Pfizer COVID-19 vaccine, bivalent, age 12 years and older (30 mcg/0.3 mL) 10/03/2022    Pfizer Gray Cap SARS-CoV-2 04/11/2022    Pneumococcal polysaccharide vaccine, 23-valent, age 2 years and older (PNEUMOVAX 23) 07/20/2005, 11/21/2008, 06/08/2012, 02/13/2015    RSV, 60 Years And Older (AREXVY) 09/02/2023    Td vaccine, age 7 years and older (TDVAX) 06/08/2012    Tdap vaccine, age 7 year and older (BOOSTRIX) 12/10/2008       Review of Systems  Review of systems is otherwise negative unless stated above or in history of present illness.    Objective   Visit Vitals  /75 (BP Location: Left arm,  "Patient Position: Sitting, BP Cuff Size: Adult)   Pulse 72   Temp 36.2 °C (97.1 °F)   Ht 1.727 m (5' 8\")   Wt 56.7 kg (125 lb)   SpO2 93%   BMI 19.01 kg/m²   Smoking Status Former   BSA 1.65 m²     Physical Exam  Constitutional: BMI 90 refer patient to dietitian.     General: not in acute distress.   HENT:      Head: Normocephalic and atraumatic.      Nose: Nose normal.   Eyes: Conjunctivitis     Extraocular Movements: Extraocular movements intact.      Conjunctiva/sclera: Conjunctivae normal.   Cardiovascular: Heart murmur     Rate and Rhythm: Normal rate ,  No M/R/G  Pulmonary: Barrel chest decreased air entry crackles rales rhonchi     Effort: Pulmonary effort is normal.      Breath sounds: Normal, Bilat Equal AE  Skin: Clubbing of the nail neuralgia     General: Skin is warm.   Neurological:      Mental Status: He is alert and oriented to person, place, and time.   Psychiatric:    Depression     Mood and Affect: Mood normal.         Behavior: Behavior normal.   Musculoskeletal osteoporosis osteopenia  FROM in all extremitirs,  Joint-no swelling or tenderness    Lab on 10/03/2023   Component Date Value Ref Range Status    Triiodothyronine, Free 10/03/2023 1.9 (L)  2.3 - 4.2 pg/mL Final    Thyroxine, Free 10/03/2023 1.15  0.78 - 1.48 ng/dL Final    Thyroid Stimulating Hormone 10/03/2023 0.46  0.44 - 3.98 mIU/L Final   Orders Only on 09/22/2023   Component Date Value Ref Range Status    [facility] HGB A1C 09/22/2023 5.4  % Final    [facility] GFR AA 09/22/2023 >60   Final    [facility] CO2, venous 09/22/2023 26.0  20.0 - 31.0 mmol/L Final    [facility] K 09/22/2023 4.0  3.5 - 5.1 mmol/L Final    [facility] Calculated Osmolality 09/22/2023 284  275 - 295 mOsm/kg Final    [facility] BUN 09/22/2023 9  9 - 23 mg/dL Final    [facility] Calcium 09/22/2023 9.4  8.7 - 10.4 mg/dL Final    [facility] Glucose 09/22/2023 96  74 - 106 mg/dL Final    [facility] Chloride 09/22/2023 107  98 - 107 mmol/L Final    [facility] " Glomerular Filtration R* 09/22/2023 >60  mL/min/1.73m? Final    [facility] Na 09/22/2023 143  135 - 145 mmol/L Final    [facility] BUN/Creat Ratio 09/22/2023 12.9   Final    [facility] Creatinine 09/22/2023 0.7  0.6 - 1.1 mg/dL Final    [facility] TSH 09/22/2023 0.36 (L)  0.55 - 4.78 uIU/ml Final    [facility] MCV 09/22/2023 84.5  80.0 - 100.0 fL Final    [facility] MPV 09/22/2023 7.6  7.4 - 10.4 fL Final    [facility] HGB 09/22/2023 12.0 (L)  13.5 - 17.5 g/dL Final    [facility] WBC 09/22/2023 5.9  4.5 - 11.0 x10 Final    [facility] RDW 09/22/2023 16.1 (H)  11.5 - 14.5 % Final    [facility] MCH 09/22/2023 27.7  27.0 - 34.0 pg Final    [facility] Nucleated RBC 09/22/2023 0  /100WBC Final    [facility] HCT 09/22/2023 36.7 (L)  41.0 - 52.0 % Final    [facility] RBC 09/22/2023 4.34 (L)  4.70 - 6.10 x10 Final    [facility] Platelet 09/22/2023 299  150 - 450 x10 Final    [facility] Instr WBC 09/22/2023 5.9   Final    [facility] MCHC 09/22/2023 32.7  32.0 - 37.0 g/dL Final    [facility] DIFF? 09/22/2023 No   Final    [facility] Mono % 09/22/2023 6.9  % Final    [facility] Neutrophil % 09/22/2023 72.9  % Final    [facility] Mono Count 09/22/2023 0.40  0.10 - 1.00 x1000 Final    [facility] Neutrophil Count (ANC) 09/22/2023 4.29  1.40 - 8.80 x1000 Final    [facility] Eosin % 09/22/2023 0.5  % Final    [facility] Red Blood Cell Morpholo* 09/22/2023 See Notes (A)   Final    [facility] Eos Count 09/22/2023 0.03  0.00 - 0.50 x1000 Final    [facility] Lymph % 09/22/2023 19.0  % Final    [facility] Lymph Count 09/22/2023 1.12 (L)  1.20 - 4.80 x1000 Final    [facility] Basos % 09/22/2023 0.8  % Final    [facility] Baso Count 09/22/2023 0.04  0.00 - 0.20 x1000 Final       Radiology: Reviewed imaging in powerchart.  No results found.      Charting was completed using voice recognition technology and may include unintended errors.

## 2023-11-07 ENCOUNTER — TELEPHONE (OUTPATIENT)
Dept: PRIMARY CARE | Facility: CLINIC | Age: 68
End: 2023-11-07
Payer: COMMERCIAL

## 2023-11-13 ENCOUNTER — ANCILLARY PROCEDURE (OUTPATIENT)
Dept: RADIOLOGY | Facility: CLINIC | Age: 68
End: 2023-11-13
Payer: COMMERCIAL

## 2023-11-13 DIAGNOSIS — Z13.6 SCREENING FOR ABDOMINAL AORTIC ANEURYSM: ICD-10-CM

## 2023-11-13 PROCEDURE — 76706 US ABDL AORTA SCREEN AAA: CPT | Performed by: RADIOLOGY

## 2023-11-13 PROCEDURE — 76706 US ABDL AORTA SCREEN AAA: CPT

## 2023-11-16 ENCOUNTER — APPOINTMENT (OUTPATIENT)
Dept: SURGERY | Facility: CLINIC | Age: 68
End: 2023-11-16
Payer: COMMERCIAL

## 2023-11-18 ENCOUNTER — HOSPITAL ENCOUNTER (OUTPATIENT)
Dept: RADIOLOGY | Facility: HOSPITAL | Age: 68
Discharge: HOME | End: 2023-11-18
Payer: COMMERCIAL

## 2023-11-18 DIAGNOSIS — I10 ESSENTIAL (PRIMARY) HYPERTENSION: ICD-10-CM

## 2023-11-18 PROCEDURE — 75571 CT HRT W/O DYE W/CA TEST: CPT

## 2023-12-06 ENCOUNTER — TELEPHONE (OUTPATIENT)
Dept: PRIMARY CARE | Facility: CLINIC | Age: 68
End: 2023-12-06
Payer: COMMERCIAL

## 2023-12-06 DIAGNOSIS — I10 HYPERTENSION, UNSPECIFIED TYPE: ICD-10-CM

## 2023-12-06 DIAGNOSIS — M85.80 OSTEOPENIA, UNSPECIFIED LOCATION: ICD-10-CM

## 2023-12-07 RX ORDER — ALENDRONATE SODIUM 70 MG/1
70 TABLET ORAL
Qty: 12 TABLET | Refills: 3 | Status: SHIPPED | OUTPATIENT
Start: 2023-12-07

## 2023-12-07 RX ORDER — VALSARTAN 80 MG/1
80 TABLET ORAL DAILY
Qty: 90 TABLET | Refills: 3 | Status: SHIPPED | OUTPATIENT
Start: 2023-12-07

## 2023-12-14 ENCOUNTER — OFFICE VISIT (OUTPATIENT)
Dept: SURGERY | Facility: CLINIC | Age: 68
End: 2023-12-14
Payer: COMMERCIAL

## 2023-12-14 DIAGNOSIS — S30.1XXA ABDOMINAL WALL HEMATOMA, INITIAL ENCOUNTER: ICD-10-CM

## 2023-12-14 PROCEDURE — 1125F AMNT PAIN NOTED PAIN PRSNT: CPT | Performed by: SURGERY

## 2023-12-14 PROCEDURE — 1159F MED LIST DOCD IN RCRD: CPT | Performed by: SURGERY

## 2023-12-14 PROCEDURE — 99204 OFFICE O/P NEW MOD 45 MIN: CPT | Performed by: SURGERY

## 2023-12-14 PROCEDURE — 4010F ACE/ARB THERAPY RXD/TAKEN: CPT | Performed by: SURGERY

## 2023-12-14 PROCEDURE — 1036F TOBACCO NON-USER: CPT | Performed by: SURGERY

## 2023-12-14 PROCEDURE — 1160F RVW MEDS BY RX/DR IN RCRD: CPT | Performed by: SURGERY

## 2023-12-14 NOTE — PROGRESS NOTES
Subjective   Patient ID: Brian Low is a 68 y.o. male who presents for Follow-up (Hematoma left side of abdomen).  Patient concerned in the bulge in left side of the abdomen, discomfort.  Was referred by primary care physician for assessment    HPI patient had the symptoms for last couple months.  There was no improvement.  Patient was referred to my attention.  The patient has significant comorbidities, including severe peripheral vascular disease.  Review of Systems musculoskeletal consistent with a bulge in left lateral abdominal wall, weakness in the lower extremities.  Review of all other 10 system is negative  Physical Exam pupils equal bilaterally, oral mucosa moist, bilateral breath sounds, irregular rhythm and rate, abdomen soft, no significant tenderness there is a palpable bulge in the left sides of the abdominal wall.    Objective I reviewed all available data including lab results, radiological studies, previous reports and notes.  All available radiological studies from [facility] were reviewed, spent 47 minutes in the review    No diagnosis found.   Patient Active Problem List   Diagnosis    Gout    Chronic obstructive pulmonary disease (CMS/HCC)    PAD (peripheral artery disease) (CMS/HCC)    Biventricular congestive heart failure (CMS/HCC)    Type 2 diabetes mellitus without complication, without long-term current use of insulin (CMS/[facility])    Encounter for screening for lung cancer    Hypertension    Ex-cigarette smoker    Chronic abdominal pain      Allergies   Allergen Reactions    Strawberry Unknown      Medication Documentation Review Audit       Reviewed by Sofy Orellana MA (Medical Assistant) on 12/14/23 at 1018      Medication Order Taking? Sig Documenting Provider Last Dose Status   albuterol 2.5 mg /3 mL (0.083 %) nebulizer solution [id number] Yes Take 3 mL (2.5 mg) by nebulization. Historical Provider, MD Taking Active   alendronate (Fosamax) 70 mg tablet [id number] Yes Take 1  tablet (70 mg) by mouth every 7 days. Preeti Slater MD Taking Active   aspirin 81 mg EC tablet [id number] Yes Take 1 tablet (81 mg) by mouth once daily. Historical Provider, MD Taking Active   blood sugar diagnostic strip [id number] Yes Testing once a day Preeti Slater MD Taking Active   blood-glucose meter (OneTouch Verio Flex meter) misc [id number] Yes USE AS DIRECTED Preeti Slater MD Taking Active   calcium carbonate-vitamin D3 500 mg-5 mcg (200 unit) tablet [id number] Yes Take by mouth. Historical Provider, MD Taking Active   Cartia  mg 24 hr capsule [id number] Yes Take 1 capsule (180 mg) by mouth 2 times a day. Historical Provider, MD Taking Active   cloNIDine (Catapres) 0.1 mg tablet [id number] Yes Take 1 tablet (0.1 mg) by mouth 2 times a day. Historical Provider, MD Taking Active   febuxostat (Uloric) 40 mg tablet [id number] Yes Take by mouth. Historical Provider, MD Taking Active   fluticasone (Flonase) 50 mcg/actuation nasal spray [id number] Yes Administer 1 spray into each nostril once daily. Shake gently. Before first use, prime pump. After use, clean tip and replace cap. Preeti Slater MD Taking Active   gabapentin (Neurontin) 100 mg capsule [id number] Yes Take 1 capsule (100 mg) by mouth 2 times a day. David Provider, MD Taking Active   lovastatin (Mevacor) 40 mg tablet [id number] Yes Take 1 tablet (40 mg) by mouth once daily at bedtime. Historical Provider, MD Taking Active   montelukast (Singulair) 10 mg tablet [id number] Yes Take 1 tablet (10 mg) by mouth once daily. Preeti Slater MD Taking Active   nitroglycerin (Nitrostat) 0.4 mg SL tablet [id number] Yes Place 1 tablet (0.4 mg) under the tongue every 5 minutes if needed for chest pain. Preeti Slater MD Taking Active   Norco 5-325 mg tablet [id number] Yes 1 tabs, ORAL, BID, Refill(s) 0, Date: 06/20/2023 17:23:00 EDT Historical Provider, MD Taking Active   omeprazole (PriLOSEC) 20 mg DR capsule [id number] Yes Take 1 capsule (20 mg)  by mouth once daily. Historical Provider, MD Taking Active   predniSONE (Deltasone) 5 mg tablet [id number] Yes Take 1 tablet (5 mg) by mouth once daily. Preeti Slater MD Taking Active   roflumilast (Daliresp) 500 mcg tablet [id number] Yes Take 1 tablet (500 mcg) by mouth once daily. Preeti Slater MD Taking Active   valsartan (Diovan) 80 mg tablet [id number] Yes Take 1 tablet (80 mg) by mouth once daily. Preeti Slater MD Taking Active   Xarelto 2.5 mg tablet [id number] Yes Take 1 tablet (2.5 mg) by mouth in the morning and 1 tablet (2.5 mg) before bedtime. Preeti Slater MD Taking Active                    Past Medical History:   Diagnosis Date    Abnormal level of blood mineral 12/29/2017    Low magnesium levels    Anorexia 04/19/2021    Anorexia    Carpal tunnel syndrome, right upper limb 03/17/2022    Carpal tunnel syndrome of right wrist    Effusion, unspecified ankle 07/02/2019    Ankle edema    Encounter for immunization 01/25/2022    Encounter for immunization    Encounter for screening for malignant neoplasm of colon 01/25/2022    Colon cancer screening    Encounter for screening for malignant neoplasm of rectum 01/25/2022    Screening for rectal cancer    Essential (primary) hypertension 09/25/2020    Benign essential hypertension    Heart failure, unspecified (CMS/HCC) 12/28/2017    CHF (NYHA class III, ACC/AHA stage C)    Hydrocele, unspecified 06/06/2016    Hydrocele, left    Hydrocele, unspecified 09/28/2015    Left hydrocele    Irritable bowel syndrome with constipation 10/06/2022    Irritable bowel syndrome with constipation    Left lower quadrant pain 11/09/2015    Inguinal pain, left    Other conditions influencing health status 02/09/2016    History of cough    Other long term (current) drug therapy 09/25/2020    Long term use of drug    Other specified postprocedural states 07/16/2020    H/O right wrist surgery    Other symptoms and signs involving the musculoskeletal system 01/25/2022     Leg weakness    Pain in left hip 02/18/2020    Left hip pain    Personal history of colonic polyps 07/09/2018    History of colonic polyps    Personal history of diseases of the blood and blood-forming organs and certain disorders involving the immune mechanism 08/17/2020    History of anemia    Personal history of other (healed) physical injury and trauma 01/25/2022    History of trauma    Personal history of other diseases of male genital organs 12/04/2019    History of impotence    Personal history of other diseases of the circulatory system 08/17/2020    History of hypertension    Personal history of other diseases of the digestive system 08/25/2022    History of constipation    Personal history of other diseases of the musculoskeletal system and connective tissue 09/25/2020    History of polymyalgia rheumatica    Personal history of other diseases of the nervous system and sense organs 04/12/2017    History of otitis media    Personal history of other endocrine, nutritional and metabolic disease 04/15/2022    History of diabetes mellitus    Personal history of other endocrine, nutritional and metabolic disease 01/25/2022    History of vitamin D deficiency    Personal history of other endocrine, nutritional and metabolic disease 08/17/2020    History of hyperlipidemia    Personal history of other specified conditions 11/09/2015    History of abdominal pain    Radiculopathy, cervical region 07/16/2020    Cervical radiculopathy    Radiculopathy, lumbar region 09/25/2020    Chronic lumbar radiculopathy    Rash and other nonspecific skin eruption 01/25/2022    Skin rash    Right lower quadrant pain 06/23/2014    Inguinal pain of both sides    Scrotal pain 09/17/2015    Scrotal pain    Spinal stenosis, lumbosacral region 01/25/2022    Lumbosacral stenosis with neurogenic claudication     Social History     Tobacco Use   Smoking Status Former    Types: Cigarettes   Smokeless Tobacco Former     No family history on  file.   Past Surgical History:   Procedure Laterality Date    CARDIAC SURGERY  05/08/2014    Heart Surgery    OTHER SURGICAL HISTORY  08/25/2022    Leg surgery       Assessment/Plan   The patient with complicated past medical history, on anticoagulation, referred with suspicion for abdominal hematoma.  The patient will benefit from CT scan of abdomen pelvis to rule out hematoma.  Currently patient is hemodynamically stable.  No evidence of active bleeding.  Follow-up in office after CT scan is done for further assessment.      Carlos Sarkar MD

## 2023-12-21 ENCOUNTER — TELEPHONE (OUTPATIENT)
Dept: PRIMARY CARE | Facility: CLINIC | Age: 68
End: 2023-12-21
Payer: COMMERCIAL

## 2023-12-21 DIAGNOSIS — I10 HYPERTENSION, UNSPECIFIED TYPE: ICD-10-CM

## 2023-12-21 RX ORDER — DILTIAZEM HYDROCHLORIDE 180 MG/1
180 CAPSULE, COATED, EXTENDED RELEASE ORAL 2 TIMES DAILY
Qty: 180 CAPSULE | Refills: 3 | Status: SHIPPED | OUTPATIENT
Start: 2023-12-21 | End: 2024-02-26 | Stop reason: SDUPTHER

## 2023-12-22 ENCOUNTER — APPOINTMENT (OUTPATIENT)
Dept: RADIOLOGY | Facility: CLINIC | Age: 68
End: 2023-12-22
Payer: COMMERCIAL

## 2023-12-27 ENCOUNTER — ANCILLARY PROCEDURE (OUTPATIENT)
Dept: RADIOLOGY | Facility: CLINIC | Age: 68
End: 2023-12-27
Payer: COMMERCIAL

## 2023-12-27 ENCOUNTER — TELEPHONE (OUTPATIENT)
Dept: PRIMARY CARE | Facility: CLINIC | Age: 68
End: 2023-12-27
Payer: COMMERCIAL

## 2023-12-27 DIAGNOSIS — Z87.891 PERSONAL HISTORY OF NICOTINE DEPENDENCE: ICD-10-CM

## 2023-12-27 PROCEDURE — 71271 CT THORAX LUNG CANCER SCR C-: CPT

## 2023-12-27 PROCEDURE — 71271 CT THORAX LUNG CANCER SCR C-: CPT | Performed by: RADIOLOGY

## 2023-12-27 NOTE — TELEPHONE ENCOUNTER
lovastatin (Mevacor) 40 mg tablet   Amlodipine 100 mg   dilTIAZem CD (Cartia XT) 180 mg 24 hr capsule     The above goes to [facility]

## 2023-12-28 NOTE — TELEPHONE ENCOUNTER
----- Message from Preeti Slater MD sent at 12/28/2023 12:05 PM EST -----  Abnormal CAT scan advised to follow-up with the pulmonary for COPD lung nodule Dr. Rosado  Atherosclerotic carotid coronary disease advised follow Dr. Shanks  Nonspecific ileus advised high-fiber diet drink lots of fluid and liquids repeat KUB 4 weeks if develop any abdominal pain call back

## 2024-01-01 PROCEDURE — 99291 CRITICAL CARE FIRST HOUR: CPT | Performed by: EMERGENCY MEDICINE

## 2024-01-01 PROCEDURE — 99233 SBSQ HOSP IP/OBS HIGH 50: CPT | Performed by: INTERNAL MEDICINE

## 2024-01-05 ENCOUNTER — OFFICE VISIT (OUTPATIENT)
Dept: SURGERY | Facility: CLINIC | Age: 69
End: 2024-01-05
Payer: COMMERCIAL

## 2024-01-05 DIAGNOSIS — E11.69 HYPERLIPIDEMIA ASSOCIATED WITH TYPE 2 DIABETES MELLITUS (MULTI): ICD-10-CM

## 2024-01-05 DIAGNOSIS — E78.5 HYPERLIPIDEMIA ASSOCIATED WITH TYPE 2 DIABETES MELLITUS (MULTI): ICD-10-CM

## 2024-01-05 DIAGNOSIS — M10.9 GOUT, UNSPECIFIED CAUSE, UNSPECIFIED CHRONICITY, UNSPECIFIED SITE: ICD-10-CM

## 2024-01-05 DIAGNOSIS — R19.00 ABDOMINAL WALL BULGE: Primary | ICD-10-CM

## 2024-01-05 PROCEDURE — 99214 OFFICE O/P EST MOD 30 MIN: CPT | Performed by: PHYSICIAN ASSISTANT

## 2024-01-05 PROCEDURE — 1159F MED LIST DOCD IN RCRD: CPT | Performed by: PHYSICIAN ASSISTANT

## 2024-01-05 PROCEDURE — 1036F TOBACCO NON-USER: CPT | Performed by: PHYSICIAN ASSISTANT

## 2024-01-05 PROCEDURE — 1125F AMNT PAIN NOTED PAIN PRSNT: CPT | Performed by: PHYSICIAN ASSISTANT

## 2024-01-05 PROCEDURE — 4010F ACE/ARB THERAPY RXD/TAKEN: CPT | Performed by: PHYSICIAN ASSISTANT

## 2024-01-05 RX ORDER — ALLOPURINOL 100 MG/1
TABLET ORAL
Qty: 90 TABLET | Refills: 3 | Status: SHIPPED | OUTPATIENT
Start: 2024-01-05 | End: 2024-02-08 | Stop reason: SDUPTHER

## 2024-01-05 RX ORDER — ALLOPURINOL 100 MG/1
TABLET ORAL
COMMUNITY
Start: 2023-11-27 | End: 2024-01-05 | Stop reason: SDUPTHER

## 2024-01-05 RX ORDER — LOVASTATIN 40 MG/1
40 TABLET ORAL NIGHTLY
Qty: 90 TABLET | Refills: 3 | Status: SHIPPED | OUTPATIENT
Start: 2024-01-05 | End: 2024-01-11 | Stop reason: SDUPTHER

## 2024-01-05 NOTE — PROGRESS NOTES
Subjective   Patient ID: Brian Low is a 68 y.o. male who presents for Follow-up (CT scan results).    HPI  68-year-old gentleman here for follow-up from a CAT scan.  Was seen here several weeks ago for what he felt was a lump in the left side of his abdomen.  He is not having any pain.  No nausea no vomiting no diarrhea he is moving his bowels well he is status post a lung scan that shows some atherosclerosis of his heart and carotid areas he has to follow-up with his cardiologist for.      Review of Systems  Negative other than mentioned in HPI    ENT: No earache, no sore throat, no nosebleeds  Cardiovascular: No chest pain, no shortness of breath, no leg pain, no edema  Respiratory: No shortness of breath on exertion, no wheezing  Gastrointestinal: No abdominal pain, no melena, no nausea, vomiting and/or diarrhea  Musculoskeletal: No pain moving all extremities, no back pain ambulating normally  Skin: No rashes, no lesions, and no skin changes  Neuro: No headache, no confusion, no numbness and tingling  Psychiatric, normal mood, not suicidal, not homicidal, feeling good          Physical Exam  Eyes: Conjunctiva non -icteric and eye lids are without obvious rash or drooping. Pupils are symmetric.   Ears, Nose, Mouth, and Throat: External ears and nose appear to be without deformity or rash. No lesions or masses noted. Hearing is grossly intact.   Neck:. No JVD noted, tracheal position is midline. No thyromegaly, no thyroid nodules  Head and Face: Examination of the head and face revealed no abnormalities.   Respiratory: No gasping or shortness of breath noted, no use of accessory muscles noted. Clear to auscultate bilaterally  Cardiovascular: Examination for edema is normal. Regular rate and rhythm S1 S2 without murmurs  GI: Abdomen non tender to palpation, bowel sounds present no hepatosplenomegaly  Small left sided bulge  left side  Skin: No rashes or open lesions/ulcers identified on skin.   Musk:  Digits/nails show no clubbing or cyanosis. No asymmetry or masses noted of the musculature. Examination of the muscles/joints/bones show normal range of motion. Gait is grossly normally.   Neurologic: Cranial nerves II- XII intact, motor strength 5/5 muscle strength of the lower extremities bilaterally and equal.      Objective     No diagnosis found.   Patient Active Problem List   Diagnosis    Gout    Chronic obstructive pulmonary disease (CMS/[facility])    PAD (peripheral artery disease) (CMS/[facility])    Biventricular congestive heart failure (CMS/[facility])    Type 2 diabetes mellitus without complication, without long-term current use of insulin (CMS/[facility])    Encounter for screening for lung cancer    Hypertension    Ex-cigarette smoker    Chronic abdominal pain      Allergies   Allergen Reactions    Strawberry Unknown      Medication Documentation Review Audit       Reviewed by Sofy Orellana MA (Medical Assistant) on 01/05/24 at 0926      Medication Order Taking? Sig Documenting Provider Last Dose Status   albuterol 2.5 mg /3 mL (0.083 %) nebulizer solution [id number] No Take 3 mL (2.5 mg) by nebulization. Historical MD Nathan Taking Active   alendronate (Fosamax) 70 mg tablet [id number] No Take 1 tablet (70 mg) by mouth every 7 days. Preeti Slater MD Taking Active   aspirin 81 mg EC tablet [id number] No Take 1 tablet (81 mg) by mouth once daily. Historical MD Nathan Taking Active   blood sugar diagnostic strip [id number] No Testing once a day Preeti Slater MD Taking Active   blood-glucose meter (OneTouch Verio Flex meter) misc [id number] No USE AS DIRECTED Preeti Slater MD Taking Active   calcium carbonate-vitamin D3 500 mg-5 mcg (200 unit) tablet [id number] No Take by mouth. David Evans MD Taking Active   cloNIDine (Catapres) 0.1 mg tablet [id number] No Take 1 tablet (0.1 mg) by mouth 2 times a day. David Provider, MD Taking Active   dilTIAZem CD (Cartia XT) 180 mg 24 hr capsule [id number]  Take 1  capsule (180 mg) by mouth 2 times a day. Preeti Slater MD  Active   febuxostat (Uloric) 40 mg tablet [id number] No Take by mouth. Historical Provider, MD Taking Active   fluticasone (Flonase) 50 mcg/actuation nasal spray [id number] No Administer 1 spray into each nostril once daily. Shake gently. Before first use, prime pump. After use, clean tip and replace cap. Preeti Slater MD Taking Active   gabapentin (Neurontin) 100 mg capsule [id number] No Take 1 capsule (100 mg) by mouth 2 times a day. Historical Provider, MD Taking Active   lovastatin (Mevacor) 40 mg tablet [id number] No Take 1 tablet (40 mg) by mouth once daily at bedtime. Historical Provider, MD Taking Active   montelukast (Singulair) 10 mg tablet [id number] No Take 1 tablet (10 mg) by mouth once daily. Preeti Slater MD Taking Active   nitroglycerin (Nitrostat) 0.4 mg SL tablet [id number] No Place 1 tablet (0.4 mg) under the tongue every 5 minutes if needed for chest pain. Preeti Slater MD Taking Active   Norco 5-325 mg tablet [id number] No 1 tabs, ORAL, BID, Refill(s) 0, Date: 06/20/2023 17:23:00 EDT Historical Provider, MD Taking Active   omeprazole (PriLOSEC) 20 mg DR capsule [id number] No Take 1 capsule (20 mg) by mouth once daily. Historical Provider, MD Taking Active   predniSONE (Deltasone) 5 mg tablet [id number] No Take 1 tablet (5 mg) by mouth once daily. Preeti Slater MD Taking Active   roflumilast (Daliresp) 500 mcg tablet [id number] No Take 1 tablet (500 mcg) by mouth once daily. Preeti Slater MD Taking Active   valsartan (Diovan) 80 mg tablet [id number] No Take 1 tablet (80 mg) by mouth once daily. Preeti Slater MD Taking Active   Xarelto 2.5 mg tablet [id number] No Take 1 tablet (2.5 mg) by mouth in the morning and 1 tablet (2.5 mg) before bedtime. Preeti Slater MD Taking Active                    Past Medical History:   Diagnosis Date    Abnormal level of blood mineral 12/29/2017    Low magnesium levels    Anorexia  04/19/2021    Anorexia    Carpal tunnel syndrome, right upper limb 03/17/2022    Carpal tunnel syndrome of right wrist    Effusion, unspecified ankle 07/02/2019    Ankle edema    Encounter for immunization 01/25/2022    Encounter for immunization    Encounter for screening for malignant neoplasm of colon 01/25/2022    Colon cancer screening    Encounter for screening for malignant neoplasm of rectum 01/25/2022    Screening for rectal cancer    Essential (primary) hypertension 09/25/2020    Benign essential hypertension    Heart failure, unspecified (CMS/HCC) 12/28/2017    CHF (NYHA class III, ACC/AHA stage C)    Hydrocele, unspecified 06/06/2016    Hydrocele, left    Hydrocele, unspecified 09/28/2015    Left hydrocele    Irritable bowel syndrome with constipation 10/06/2022    Irritable bowel syndrome with constipation    Left lower quadrant pain 11/09/2015    Inguinal pain, left    Other conditions influencing health status 02/09/2016    History of cough    Other long term (current) drug therapy 09/25/2020    Long term use of drug    Other specified postprocedural states 07/16/2020    H/O right wrist surgery    Other symptoms and signs involving the musculoskeletal system 01/25/2022    Leg weakness    Pain in left hip 02/18/2020    Left hip pain    Personal history of colonic polyps 07/09/2018    History of colonic polyps    Personal history of diseases of the blood and blood-forming organs and certain disorders involving the immune mechanism 08/17/2020    History of anemia    Personal history of other (healed) physical injury and trauma 01/25/2022    History of trauma    Personal history of other diseases of male genital organs 12/04/2019    History of impotence    Personal history of other diseases of the circulatory system 08/17/2020    History of hypertension    Personal history of other diseases of the digestive system 08/25/2022    History of constipation    Personal history of other diseases of the  musculoskeletal system and connective tissue 09/25/2020    History of polymyalgia rheumatica    Personal history of other diseases of the nervous system and sense organs 04/12/2017    History of otitis media    Personal history of other endocrine, nutritional and metabolic disease 04/15/2022    History of diabetes mellitus    Personal history of other endocrine, nutritional and metabolic disease 01/25/2022    History of vitamin D deficiency    Personal history of other endocrine, nutritional and metabolic disease 08/17/2020    History of hyperlipidemia    Personal history of other specified conditions 11/09/2015    History of abdominal pain    Radiculopathy, cervical region 07/16/2020    Cervical radiculopathy    Radiculopathy, lumbar region 09/25/2020    Chronic lumbar radiculopathy    Rash and other nonspecific skin eruption 01/25/2022    Skin rash    Right lower quadrant pain 06/23/2014    Inguinal pain of both sides    Scrotal pain 09/17/2015    Scrotal pain    Spinal stenosis, lumbosacral region 01/25/2022    Lumbosacral stenosis with neurogenic claudication     Social History     Tobacco Use   Smoking Status Former    Types: Cigarettes   Smokeless Tobacco Former     No family history on file.   Past Surgical History:   Procedure Laterality Date    CARDIAC SURGERY  05/08/2014    Heart Surgery    OTHER SURGICAL HISTORY  08/25/2022    Leg surgery       Assessment/Plan   We had a CT discussion.  There is no hernias or hematomas.  They are not most likely left-sided muscle wall weakness.  There is no surgical issues.  This was explained to the patient.  Patient should follow-up as needed.    CT scan reviewed with patient.  There is a small hiatal hernia some colonic diverticulosis there is no abdominal wall hematoma or mass present.  Possible air-fluid levels with enteritis in the small bowel no hernias present.    Encounter Diagnosis   Name Primary?    Abdominal wall bulge Yes         **Portions of this medical  record have been created using voice recognition software and may have minor errors which are inherent in voice recognition systems. It has not been fully edited for typographical or grammatical errors**

## 2024-01-05 NOTE — TELEPHONE ENCOUNTER
----- Message from Preeti Slater MD sent at 1/5/2024  2:20 PM EST -----  High calcium score advised to see Dr. Shanks    COPD advised to get spirometry in office

## 2024-01-11 ENCOUNTER — OFFICE VISIT (OUTPATIENT)
Dept: PRIMARY CARE | Facility: CLINIC | Age: 69
End: 2024-01-11
Payer: COMMERCIAL

## 2024-01-11 VITALS
SYSTOLIC BLOOD PRESSURE: 119 MMHG | WEIGHT: 128 LBS | HEIGHT: 68 IN | TEMPERATURE: 96 F | DIASTOLIC BLOOD PRESSURE: 67 MMHG | OXYGEN SATURATION: 96 % | BODY MASS INDEX: 19.4 KG/M2 | HEART RATE: 98 BPM

## 2024-01-11 DIAGNOSIS — I73.9 PAD (PERIPHERAL ARTERY DISEASE) (CMS-HCC): ICD-10-CM

## 2024-01-11 DIAGNOSIS — N40.0 BENIGN PROSTATIC HYPERPLASIA WITHOUT LOWER URINARY TRACT SYMPTOMS: ICD-10-CM

## 2024-01-11 DIAGNOSIS — I70.223 ATHEROSCLEROSIS OF NATIVE ARTERIES OF EXTREMITIES WITH REST PAIN, BILATERAL LEGS (MULTI): ICD-10-CM

## 2024-01-11 DIAGNOSIS — R09.89 DECREASED CIRCULATION IN FINGERS OR TOES DUE TO SMOKING: ICD-10-CM

## 2024-01-11 DIAGNOSIS — E11.9 TYPE 2 DIABETES MELLITUS WITHOUT COMPLICATION, WITHOUT LONG-TERM CURRENT USE OF INSULIN (MULTI): ICD-10-CM

## 2024-01-11 DIAGNOSIS — Z13.6 SCREENING FOR ABDOMINAL AORTIC ANEURYSM: ICD-10-CM

## 2024-01-11 DIAGNOSIS — E78.5 HYPERLIPIDEMIA ASSOCIATED WITH TYPE 2 DIABETES MELLITUS (MULTI): ICD-10-CM

## 2024-01-11 DIAGNOSIS — E11.69 HYPERLIPIDEMIA ASSOCIATED WITH TYPE 2 DIABETES MELLITUS (MULTI): ICD-10-CM

## 2024-01-11 DIAGNOSIS — M05.711 RHEUMATOID ARTHRITIS INVOLVING RIGHT SHOULDER WITH POSITIVE RHEUMATOID FACTOR (MULTI): ICD-10-CM

## 2024-01-11 DIAGNOSIS — J43.2 CENTRILOBULAR EMPHYSEMA (MULTI): ICD-10-CM

## 2024-01-11 DIAGNOSIS — I50.82 BIVENTRICULAR CONGESTIVE HEART FAILURE (MULTI): ICD-10-CM

## 2024-01-11 DIAGNOSIS — Z79.899 MEDICATION MANAGEMENT: ICD-10-CM

## 2024-01-11 DIAGNOSIS — F17.200 DECREASED CIRCULATION IN FINGERS OR TOES DUE TO SMOKING: ICD-10-CM

## 2024-01-11 DIAGNOSIS — Z00.00 MEDICARE ANNUAL WELLNESS VISIT, SUBSEQUENT: Primary | ICD-10-CM

## 2024-01-11 PROCEDURE — G0439 PPPS, SUBSEQ VISIT: HCPCS | Performed by: INTERNAL MEDICINE

## 2024-01-11 PROCEDURE — 1170F FXNL STATUS ASSESSED: CPT | Performed by: INTERNAL MEDICINE

## 2024-01-11 PROCEDURE — 1036F TOBACCO NON-USER: CPT | Performed by: INTERNAL MEDICINE

## 2024-01-11 PROCEDURE — 3074F SYST BP LT 130 MM HG: CPT | Performed by: INTERNAL MEDICINE

## 2024-01-11 PROCEDURE — G0444 DEPRESSION SCREEN ANNUAL: HCPCS | Performed by: INTERNAL MEDICINE

## 2024-01-11 PROCEDURE — 1125F AMNT PAIN NOTED PAIN PRSNT: CPT | Performed by: INTERNAL MEDICINE

## 2024-01-11 PROCEDURE — 3078F DIAST BP <80 MM HG: CPT | Performed by: INTERNAL MEDICINE

## 2024-01-11 PROCEDURE — 1159F MED LIST DOCD IN RCRD: CPT | Performed by: INTERNAL MEDICINE

## 2024-01-11 PROCEDURE — 4010F ACE/ARB THERAPY RXD/TAKEN: CPT | Performed by: INTERNAL MEDICINE

## 2024-01-11 RX ORDER — LOVASTATIN 40 MG/1
40 TABLET ORAL NIGHTLY
Qty: 90 TABLET | Refills: 3 | Status: SHIPPED | OUTPATIENT
Start: 2024-01-11

## 2024-01-11 ASSESSMENT — PATIENT HEALTH QUESTIONNAIRE - PHQ9
SUM OF ALL RESPONSES TO PHQ9 QUESTIONS 1 AND 2: 2
2. FEELING DOWN, DEPRESSED OR HOPELESS: SEVERAL DAYS
10. IF YOU CHECKED OFF ANY PROBLEMS, HOW DIFFICULT HAVE THESE PROBLEMS MADE IT FOR YOU TO DO YOUR WORK, TAKE CARE OF THINGS AT HOME, OR GET ALONG WITH OTHER PEOPLE: SOMEWHAT DIFFICULT
1. LITTLE INTEREST OR PLEASURE IN DOING THINGS: SEVERAL DAYS

## 2024-01-11 ASSESSMENT — ACTIVITIES OF DAILY LIVING (ADL)
MANAGING_FINANCES: INDEPENDENT
DRESSING: INDEPENDENT
BATHING: INDEPENDENT
GROCERY_SHOPPING: NEEDS ASSISTANCE
TAKING_MEDICATION: NEEDS ASSISTANCE
DOING_HOUSEWORK: INDEPENDENT

## 2024-01-11 NOTE — PROGRESS NOTES
Assessment and Plan:  Problem List Items Addressed This Visit       Chronic obstructive pulmonary disease (CMS/HCC)    Atherosclerosis of native arteries of extremities with rest pain, bilateral legs (CMS/HCC)    Biventricular congestive heart failure (CMS/HCC)    Relevant Orders    Hepatitis C antibody    Aorta iliac duplex limited    Transthoracic echo (TTE) complete    Albumin , Urine Random    CBC and Auto Differential    Comprehensive Metabolic Panel    Drug Screen, Urine With Reflex to Confirmation    Hemoglobin A1C    Lipid Panel    Magnesium    Microscopic Only, Urine    Uric Acid    TSH with reflex to Free T4 if abnormal    Prostate Specific Antigen, Screen    Transthoracic Echo (TTE) Complete    Type 2 diabetes mellitus without complication, without long-term current use of insulin (CMS/HCC)    Relevant Orders    Hepatitis C antibody    Aorta iliac duplex limited    Transthoracic echo (TTE) complete    Albumin , Urine Random    CBC and Auto Differential    Comprehensive Metabolic Panel    Drug Screen, Urine With Reflex to Confirmation    Hemoglobin A1C    Lipid Panel    Magnesium    Microscopic Only, Urine    Uric Acid    TSH with reflex to Free T4 if abnormal    Prostate Specific Antigen, Screen    Transthoracic Echo (TTE) Complete    Medicare annual wellness visit, subsequent - Primary    Relevant Orders    Hepatitis C antibody    Aorta iliac duplex limited    Transthoracic echo (TTE) complete    Albumin , Urine Random    CBC and Auto Differential    Comprehensive Metabolic Panel    Drug Screen, Urine With Reflex to Confirmation    Hemoglobin A1C    Lipid Panel    Magnesium    Microscopic Only, Urine    Uric Acid    TSH with reflex to Free T4 if abnormal    Prostate Specific Antigen, Screen    Transthoracic Echo (TTE) Complete    Hyperlipidemia associated with type 2 diabetes mellitus (CMS/HCC)    Relevant Medications    lovastatin (Mevacor) 40 mg tablet    Other Relevant Orders    Hepatitis C antibody     Aorta iliac duplex limited    Transthoracic echo (TTE) complete    Albumin , Urine Random    CBC and Auto Differential    Comprehensive Metabolic Panel    Drug Screen, Urine With Reflex to Confirmation    Hemoglobin A1C    Lipid Panel    Magnesium    Microscopic Only, Urine    Uric Acid    TSH with reflex to Free T4 if abnormal    Prostate Specific Antigen, Screen    Transthoracic Echo (TTE) Complete    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 (CMS/HCC)     Other Visit Diagnoses       Screening for abdominal aortic aneurysm        Relevant Orders    Hepatitis C antibody    Aorta iliac duplex limited    Transthoracic echo (TTE) complete    Albumin , Urine Random    CBC and Auto Differential    Comprehensive Metabolic Panel    Drug Screen, Urine With Reflex to Confirmation    Hemoglobin A1C    Lipid Panel    Magnesium    Microscopic Only, Urine    Uric Acid    TSH with reflex to Free T4 if abnormal    Prostate Specific Antigen, Screen    Vascular US Abdominal Aorta Aneurysm AAA Screening    Transthoracic Echo (TTE) Complete    Benign prostatic hyperplasia without lower urinary tract symptoms        Relevant Orders    Prostate Specific Antigen, Screen    Decreased circulation in fingers or toes due to smoking        Relevant Orders    Aorta iliac duplex limited    Medication management        Relevant Orders    Drug Screen, Urine With Reflex to Confirmation    PAD (peripheral artery disease) (CMS/HCC)                  Chief Complaint:   Annual Medicare Wellness Office Exam/Comprehensive Problem Focused Follow Up and Physical Exam      HPI: 68-year-old patient  with 1 child    6 brother 1 sister    Mother have diabetes    Father of a stroke    Negative for any cancer    Surgical history right leg femoropopliteal bypass surgery    CABG    Tracheostomy laminectomy    History of COPD hypertension hyperlipidemia PAD CAD    Chronic anemia    Chronic anxiety  depression    Osteoarthritis gouty arthritis    Complaining of the cough congestion arthralgia myalgia fatigue tired weakness claudication    Negative for fall    Negative for suicide    Negative for Apetex hemoptysis hematuria rectal bleeding.        Patient Active Problem List:  Patient Active Problem List   Diagnosis    Gout    Chronic obstructive pulmonary disease (CMS/HCC)    Atherosclerosis of native arteries of extremities with rest pain, bilateral legs (CMS/[facility])    Biventricular congestive heart failure (CMS/[facility])    Type 2 diabetes mellitus without complication, without long-term current use of insulin (CMS/[facility])    Medicare annual wellness visit, subsequent    Hypertension    Ex-cigarette smoker    Chronic abdominal pain    Hyperlipidemia associated with type 2 diabetes mellitus (CMS/[facility])    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    Past Medical History:   Diagnosis Date    Abnormal level of blood mineral 12/29/2017    Low magnesium levels    Anorexia 04/19/2021    Anorexia    Carpal tunnel syndrome, right upper limb 03/17/2022    Carpal tunnel syndrome of right wrist    Effusion, unspecified ankle 07/02/2019    Ankle edema    Encounter for immunization 01/25/2022    Encounter for immunization    Encounter for screening for malignant neoplasm of colon 01/25/2022    Colon cancer screening    Encounter for screening for malignant neoplasm of rectum 01/25/2022    Screening for rectal cancer    Essential (primary) hypertension 09/25/2020    Benign essential hypertension    Heart failure, unspecified (CMS/[facility]) 12/28/2017    CHF (NYHA class III, ACC/AHA stage C)    Hydrocele, unspecified 06/06/2016    Hydrocele, left    Hydrocele, unspecified 09/28/2015    Left hydrocele    Irritable bowel syndrome with constipation 10/06/2022    Irritable bowel syndrome with constipation     Left lower quadrant pain 11/09/2015    Inguinal pain, left    Other conditions influencing health status 02/09/2016    History of cough    Other long term (current) drug therapy 09/25/2020    Long term use of drug    Other specified postprocedural states 07/16/2020    H/O right wrist surgery    Other symptoms and signs involving the musculoskeletal system 01/25/2022    Leg weakness    Pain in left hip 02/18/2020    Left hip pain    Personal history of colonic polyps 07/09/2018    History of colonic polyps    Personal history of diseases of the blood and blood-forming organs and certain disorders involving the immune mechanism 08/17/2020    History of anemia    Personal history of other (healed) physical injury and trauma 01/25/2022    History of trauma    Personal history of other diseases of male genital organs 12/04/2019    History of impotence    Personal history of other diseases of the circulatory system 08/17/2020    History of hypertension    Personal history of other diseases of the digestive system 08/25/2022    History of constipation    Personal history of other diseases of the musculoskeletal system and connective tissue 09/25/2020    History of polymyalgia rheumatica    Personal history of other diseases of the nervous system and sense organs 04/12/2017    History of otitis media    Personal history of other endocrine, nutritional and metabolic disease 04/15/2022    History of diabetes mellitus    Personal history of other endocrine, nutritional and metabolic disease 01/25/2022    History of vitamin D deficiency    Personal history of other endocrine, nutritional and metabolic disease 08/17/2020    History of hyperlipidemia    Personal history of other specified conditions 11/09/2015    History of abdominal pain    Radiculopathy, cervical region 07/16/2020    Cervical radiculopathy    Radiculopathy, lumbar region 09/25/2020    Chronic lumbar radiculopathy    Rash and other nonspecific skin eruption  01/25/2022    Skin rash    Right lower quadrant pain 06/23/2014    Inguinal pain of both sides    Scrotal pain 09/17/2015    Scrotal pain    Spinal stenosis, lumbosacral region 01/25/2022    Lumbosacral stenosis with neurogenic claudication       Past Surgical History:   Procedure Laterality Date    CARDIAC SURGERY  05/08/2014    Heart Surgery    OTHER SURGICAL HISTORY  08/25/2022    Leg surgery       Social History     Socioeconomic History    Marital status:      Spouse name: None    Number of children: None    Years of education: None    Highest education level: None   Occupational History    None   Tobacco Use    Smoking status: Former     Types: Cigarettes    Smokeless tobacco: Former   Substance and Sexual Activity    Alcohol use: Not Currently    Drug use: Not Currently    Sexual activity: None   Other Topics Concern    None   Social History Narrative    None     Social Determinants of Health     Financial Resource Strain: Not on file   Food Insecurity: Not on file   Transportation Needs: Not on file   Physical Activity: Not on file   Stress: Not on file   Social Connections: Not on file   Intimate Partner Violence: Not on file   Housing Stability: Not on file       Tobacco/Alcohol/Opioid use, as well as Illicit Drug Use was screened for/reviewed and documented in Social Documentation section of the chart and medication list as appropriate    Allergies and Medications  Allergies   Allergen Reactions    Strawberry Unknown     Current Outpatient Medications   Medication Sig Dispense Refill    albuterol 2.5 mg /3 mL (0.083 %) nebulizer solution Take 3 mL (2.5 mg) by nebulization.      alendronate (Fosamax) 70 mg tablet Take 1 tablet (70 mg) by mouth every 7 days. 12 tablet 3    allopurinol (Zyloprim) 100 mg tablet ONE A DAY 90 tablet 3    aspirin 81 mg EC tablet Take 1 tablet (81 mg) by mouth once daily.      blood sugar diagnostic strip Testing once a day 100 strip 3    blood-glucose meter (OneTouch  Verio Flex meter) misc USE AS DIRECTED 1 each 0    calcium carbonate-vitamin D3 500 mg-5 mcg (200 unit) tablet Take by mouth.      cloNIDine (Catapres) 0.1 mg tablet Take 1 tablet (0.1 mg) by mouth 2 times a day.      dilTIAZem CD (Cartia XT) 180 mg 24 hr capsule Take 1 capsule (180 mg) by mouth 2 times a day. 180 capsule 3    febuxostat (Uloric) 40 mg tablet Take by mouth.      fluticasone (Flonase) 50 mcg/actuation nasal spray Administer 1 spray into each nostril once daily. Shake gently. Before first use, prime pump. After use, clean tip and replace cap. 16 g 3    gabapentin (Neurontin) 100 mg capsule Take 1 capsule (100 mg) by mouth 2 times a day.      montelukast (Singulair) 10 mg tablet Take 1 tablet (10 mg) by mouth once daily. 90 tablet 3    nitroglycerin (Nitrostat) 0.4 mg SL tablet Place 1 tablet (0.4 mg) under the tongue every 5 minutes if needed for chest pain. 30 tablet 3    Norco 5-325 mg tablet 1 tabs, ORAL, BID, Refill(s) 0, Date: 06/20/2023 17:23:00 EDT      omeprazole (PriLOSEC) 20 mg DR capsule Take 1 capsule (20 mg) by mouth once daily.      predniSONE (Deltasone) 5 mg tablet Take 1 tablet (5 mg) by mouth once daily. 90 tablet 3    roflumilast (Daliresp) 500 mcg tablet Take 1 tablet (500 mcg) by mouth once daily. 90 tablet 3    valsartan (Diovan) 80 mg tablet Take 1 tablet (80 mg) by mouth once daily. 90 tablet 3    Xarelto 2.5 mg tablet Take 1 tablet (2.5 mg) by mouth in the morning and 1 tablet (2.5 mg) before bedtime. 180 tablet 3    lovastatin (Mevacor) 40 mg tablet Take 1 tablet (40 mg) by mouth once daily at bedtime. 90 tablet 3     No current facility-administered medications for this visit.       Medications and Supplements  prescribed by me and other practitioners or clinical pharmacist (such as prescriptions, OTC's, herbal therapies and supplements) were reviewed and documented in the medical record.     Advance directives  Advanced Care Planning (including a Living Will, Healthcare  "POA, as well as specific end of life choices and/or directives), was discussed for approximately 16 minutes with the patient and/or surrogate, voluntarily, and documented in the Problem List of the medical record.     Cardiac Risk Assessment  Cardiovascular risk was discussed and, if needed, lifestyle modifications recommended, including nutritional choices, exercise, and elimination of habits contributing to risk. We agreed on a plan to reduce the current cardiovascular risk based on above discussion as needed.  Aspirin use/disuse was discussed and documented in the Problem List of the medical record after reviewing the updated guidelines below:    Consider low dose Aspirin ( mg) use if the benefit for cardiovascular disease prevention outweighs risk for bleeding complications.   In general, low dose ASA should be considered:  In patients WITHOUT prior MI/stroke/PAD (primary prevention):   a. Age <60: Use if 10-year cardiovascular disease risk >20%, with discussion of risks and benefits with patient  b. Age 60-<70: Use if 10-year cardiovascular disease risk >20% and low bleeding (e.g., gastrointenstinal) risk, with discussion of risks and benefits with patient  c. Age >=70: Do not use    In patients WITH prior MI/stroke/PAD (secondary prevention):   Generally use unless extremely high bleeding (e.g., gastrointenstinal) risk, with discussion of risks and benefits with patient    ROS otherwise negative aside from what was mentioned above in HPI.    Visit Vitals  /67 (BP Location: Left arm, Patient Position: Sitting, BP Cuff Size: Large adult)   Pulse 98   Temp 35.6 °C (96 °F)   Ht 1.727 m (5' 8\")   Wt 58.1 kg (128 lb)   SpO2 96%   BMI 19.46 kg/m²   Smoking Status Former   BSA 1.67 m²       Physical Exam  Physical Exam:  Cachexia of chronic disease  Appearance: Alert, oriented , cooperative,  in no acute distress. Well nourished & well hydrated.    Skin:   Ischemic skin bilaterally   eyes: Cataract "     ENT: Minimal wax.     Neck: Carotid bruit  Pulmonary: Barrel chest crackles rales rhonchi.    Cardiac: Normal S1, S2 without murmur, rub, gallop or extrasystole. No JVD, Carotids without bruits.    Abdomen: Soft, nontender, active bowel sounds.  No palpable organomegaly.  No rebound or guarding.  No CVA tenderness.    Genitourinary: BPH FOBT negative  Musculoskeletal: Arthritis deformity of spine hip and knee   neurological: Lumbar radiculopathy.    Psychiatric: Appropriate mood and affect.         During the course of the visit the patient was educated and counseled about age appropriate screening and preventive services. Completed preventive screenings were documented in the chart and orders were placed for outstanding screenings/procedures as documented in the Assessment and Plan.    Patient Instructions (the written plan) was given to the patient at check out.    Charting was completed using voice recognition technology and may include unintended errors.

## 2024-01-12 PROBLEM — Z00.00 MEDICARE ANNUAL WELLNESS VISIT, SUBSEQUENT: Status: ACTIVE | Noted: 2023-11-06

## 2024-01-12 PROBLEM — M05.711 RHEUMATOID ARTHRITIS INVOLVING RIGHT SHOULDER WITH POSITIVE RHEUMATOID FACTOR (MULTI): Status: ACTIVE | Noted: 2024-01-12

## 2024-01-15 ENCOUNTER — TELEPHONE (OUTPATIENT)
Dept: PRIMARY CARE | Facility: CLINIC | Age: 69
End: 2024-01-15
Payer: COMMERCIAL

## 2024-01-15 NOTE — TELEPHONE ENCOUNTER
CALL FROM PHYSICIAN REFERRAL LINE.  PATIENT WAS ORDERED 3 TESTS ON 1/11/24. HE STATES HAD THESE TESTS DONE. PLEASE ADVISE AND CONTACT PATIENT

## 2024-01-22 ENCOUNTER — HOSPITAL ENCOUNTER (OUTPATIENT)
Dept: CARDIOLOGY | Facility: CLINIC | Age: 69
Discharge: HOME | End: 2024-01-22
Payer: COMMERCIAL

## 2024-01-22 VITALS
BODY MASS INDEX: 19.4 KG/M2 | HEIGHT: 68 IN | DIASTOLIC BLOOD PRESSURE: 67 MMHG | SYSTOLIC BLOOD PRESSURE: 119 MMHG | WEIGHT: 128 LBS

## 2024-01-22 DIAGNOSIS — I50.82 BIVENTRICULAR CONGESTIVE HEART FAILURE (MULTI): ICD-10-CM

## 2024-01-22 DIAGNOSIS — Z00.00 MEDICARE ANNUAL WELLNESS VISIT, SUBSEQUENT: ICD-10-CM

## 2024-01-22 DIAGNOSIS — I50.32 CHRONIC DIASTOLIC (CONGESTIVE) HEART FAILURE (MULTI): ICD-10-CM

## 2024-01-22 DIAGNOSIS — E11.9 TYPE 2 DIABETES MELLITUS WITHOUT COMPLICATION, WITHOUT LONG-TERM CURRENT USE OF INSULIN (MULTI): ICD-10-CM

## 2024-01-22 DIAGNOSIS — E78.5 HYPERLIPIDEMIA ASSOCIATED WITH TYPE 2 DIABETES MELLITUS (MULTI): ICD-10-CM

## 2024-01-22 DIAGNOSIS — E11.69 HYPERLIPIDEMIA ASSOCIATED WITH TYPE 2 DIABETES MELLITUS (MULTI): ICD-10-CM

## 2024-01-22 DIAGNOSIS — Z13.6 SCREENING FOR ABDOMINAL AORTIC ANEURYSM: ICD-10-CM

## 2024-01-22 PROCEDURE — 93306 TTE W/DOPPLER COMPLETE: CPT

## 2024-01-22 PROCEDURE — 93306 TTE W/DOPPLER COMPLETE: CPT | Performed by: INTERNAL MEDICINE

## 2024-01-23 LAB
AORTIC VALVE MEAN GRADIENT: 3.2 MMHG
AORTIC VALVE PEAK VELOCITY: 1.26 M/S
AV PEAK GRADIENT: 6.3 MMHG
AVA (PEAK VEL): 2.93 CM2
AVA (VTI): 2.74 CM2
EJECTION FRACTION APICAL 4 CHAMBER: 67.6
EJECTION FRACTION: 58 %
LEFT ATRIUM VOLUME AREA LENGTH INDEX BSA: 21.7 ML/M2
LEFT VENTRICLE INTERNAL DIMENSION DIASTOLE: 3.25 CM (ref 3.5–6)
LEFT VENTRICULAR OUTFLOW TRACT DIAMETER: 2.12 CM
MITRAL VALVE E/A RATIO: 0.73
RIGHT VENTRICLE FREE WALL PEAK S': 0.6 CM/S
RIGHT VENTRICLE PEAK SYSTOLIC PRESSURE: 31.4 MMHG
TRICUSPID ANNULAR PLANE SYSTOLIC EXCURSION: 1.6 CM

## 2024-02-08 ENCOUNTER — TELEPHONE (OUTPATIENT)
Dept: PRIMARY CARE | Facility: CLINIC | Age: 69
End: 2024-02-08
Payer: COMMERCIAL

## 2024-02-08 DIAGNOSIS — M10.9 GOUT, UNSPECIFIED CAUSE, UNSPECIFIED CHRONICITY, UNSPECIFIED SITE: ICD-10-CM

## 2024-02-08 RX ORDER — ALLOPURINOL 100 MG/1
TABLET ORAL
Qty: 90 TABLET | Refills: 3 | Status: SHIPPED | OUTPATIENT
Start: 2024-02-08

## 2024-02-08 NOTE — TELEPHONE ENCOUNTER
Pt needs refill on  Allopurinol 100 mg     Will be out on Monday     Pharm: [facility], [place]

## 2024-02-26 ENCOUNTER — TELEPHONE (OUTPATIENT)
Dept: PRIMARY CARE | Facility: CLINIC | Age: 69
End: 2024-02-26
Payer: COMMERCIAL

## 2024-02-26 DIAGNOSIS — J44.9 CHRONIC OBSTRUCTIVE PULMONARY DISEASE, UNSPECIFIED COPD TYPE (MULTI): ICD-10-CM

## 2024-02-26 DIAGNOSIS — I10 HYPERTENSION, UNSPECIFIED TYPE: ICD-10-CM

## 2024-02-26 DIAGNOSIS — Z12.2 ENCOUNTER FOR SCREENING FOR LUNG CANCER: Primary | ICD-10-CM

## 2024-02-26 DIAGNOSIS — Z87.891 EX-CIGARETTE SMOKER: ICD-10-CM

## 2024-02-26 RX ORDER — DILTIAZEM HYDROCHLORIDE 180 MG/1
180 CAPSULE, COATED, EXTENDED RELEASE ORAL 2 TIMES DAILY
Qty: 180 CAPSULE | Refills: 3 | Status: SHIPPED | OUTPATIENT
Start: 2024-02-26

## 2024-02-26 RX ORDER — PREDNISONE 5 MG/1
5 TABLET ORAL DAILY
Qty: 90 TABLET | Refills: 3 | Status: SHIPPED | OUTPATIENT
Start: 2024-02-26

## 2024-02-27 ENCOUNTER — TELEPHONE (OUTPATIENT)
Dept: PRIMARY CARE | Facility: CLINIC | Age: 69
End: 2024-02-27

## 2024-02-27 DIAGNOSIS — Z00.00 HEALTH CARE MAINTENANCE: ICD-10-CM

## 2024-02-27 RX ORDER — FERROUS SULFATE, DRIED 160(50) MG
2 TABLET, EXTENDED RELEASE ORAL DAILY
Qty: 180 TABLET | Refills: 3 | Status: SHIPPED | OUTPATIENT
Start: 2024-02-27 | End: 2024-04-12 | Stop reason: WASHOUT

## 2024-02-27 NOTE — TELEPHONE ENCOUNTER
TAKE 2 TABS BY MOUTH ONCE A DAY.     IT IS CALCIUM CARBONATE-VITAMIN D3 500MG-5 MCG    HE STATES BOTTLE HAS OYSTER SHELL

## 2024-03-25 DIAGNOSIS — J43.9 PULMONARY EMPHYSEMA, UNSPECIFIED EMPHYSEMA TYPE (MULTI): ICD-10-CM

## 2024-03-25 PROCEDURE — 99239 HOSP IP/OBS DSCHRG MGMT >30: CPT | Performed by: INTERNAL MEDICINE

## 2024-03-25 RX ORDER — ALBUTEROL SULFATE 90 UG/1
AEROSOL, METERED RESPIRATORY (INHALATION)
Qty: 9 G | Refills: 6 | Status: SHIPPED | OUTPATIENT
Start: 2024-03-25 | End: 2024-04-12 | Stop reason: WASHOUT

## 2024-03-27 ENCOUNTER — PATIENT OUTREACH (OUTPATIENT)
Dept: CARE COORDINATION | Facility: CLINIC | Age: 69
End: 2024-03-27
Payer: COMMERCIAL

## 2024-03-27 NOTE — PROGRESS NOTES
Discharge Facility:[facility]  Discharge Diagnosis:Peripheral vascular disease, unspecified  Admission Date:3/21/2024  Discharge Date: 3/25/2024    PCP Appointment Date:4/12/2024, declined sooner  Specialist Appointment Date: TBD  Hospital Encounter and Summary:  not available at this time (Very limited info)  See discharge assessment below for further details  Engagement  Call Start Time: 1710 (3/27/2024  9:00 AM)    Medications  Medications reviewed with patient/caregiver?: Yes (Limited information available. Brian states was given abx and prednisone) (3/27/2024  9:00 AM)  Is the patient having any side effects they believe may be caused by any medication additions or changes?: No (3/27/2024  9:00 AM)  Does the patient have all medications ordered at discharge?: Yes (3/27/2024  9:00 AM)  Care Management Interventions: No intervention needed (3/27/2024  9:00 AM)  Is the patient taking all medications as directed (includes completed medication regime)?: Yes (3/27/2024  9:00 AM)    Appointments  Does the patient have a primary care provider?: Yes (3/27/2024  9:00 AM)  Care Management Interventions: Verified appointment date/time/provider (4/12/2024, declined to move appointment up) (3/27/2024  9:00 AM)  Has the patient kept scheduled appointments due by today?: Yes (3/27/2024  9:00 AM)    Self Management  What is the home health agency?: -- (N/A) (3/27/2024  9:00 AM)  What Durable Medical Equipment (DME) was ordered?: -- (N/A) (3/27/2024  9:00 AM)    Patient Teaching  Does the patient have access to their discharge instructions?: Yes (3/27/2024  9:00 AM)  What is the patient's perception of their health status since discharge?: Improving (3/27/2024  9:00 AM)  Is the patient/caregiver able to teach back the hierarchy of who to call/visit for symptoms/problems? PCP, Specialist, Home Health nurse, Urgent Care, ED, 911: Yes (3/27/2024  9:00 AM)    Wrap Up  Wrap Up Additional Comments: -- (Brian states he is feeling  better. He is taking meds as directed and does remote monitoring with [facility]. He declined sooner PCP fu.) (3/27/2024  9:00 AM)

## 2024-03-28 DIAGNOSIS — B59: Primary | ICD-10-CM

## 2024-04-12 ENCOUNTER — OFFICE VISIT (OUTPATIENT)
Dept: PRIMARY CARE | Facility: CLINIC | Age: 69
End: 2024-04-12
Payer: COMMERCIAL

## 2024-04-12 ENCOUNTER — PATIENT OUTREACH (OUTPATIENT)
Dept: CARE COORDINATION | Facility: CLINIC | Age: 69
End: 2024-04-12

## 2024-04-12 VITALS
HEART RATE: 83 BPM | DIASTOLIC BLOOD PRESSURE: 63 MMHG | BODY MASS INDEX: 20.58 KG/M2 | TEMPERATURE: 97.1 F | WEIGHT: 135.8 LBS | OXYGEN SATURATION: 92 % | HEIGHT: 68 IN | SYSTOLIC BLOOD PRESSURE: 105 MMHG

## 2024-04-12 DIAGNOSIS — J44.9 CHRONIC OBSTRUCTIVE PULMONARY DISEASE, UNSPECIFIED COPD TYPE (MULTI): ICD-10-CM

## 2024-04-12 DIAGNOSIS — Z87.891 EX-CIGARETTE SMOKER: ICD-10-CM

## 2024-04-12 DIAGNOSIS — K21.9 GERD WITHOUT ESOPHAGITIS: ICD-10-CM

## 2024-04-12 DIAGNOSIS — M10.00 IDIOPATHIC GOUT, UNSPECIFIED CHRONICITY, UNSPECIFIED SITE: ICD-10-CM

## 2024-04-12 DIAGNOSIS — Z09 HOSPITAL DISCHARGE FOLLOW-UP: Primary | ICD-10-CM

## 2024-04-12 DIAGNOSIS — M54.42 CHRONIC MIDLINE LOW BACK PAIN WITH BILATERAL SCIATICA: ICD-10-CM

## 2024-04-12 DIAGNOSIS — G89.29 CHRONIC MIDLINE LOW BACK PAIN WITH BILATERAL SCIATICA: ICD-10-CM

## 2024-04-12 DIAGNOSIS — I73.9 CLAUDICATION OF RIGHT LOWER EXTREMITY (CMS-HCC): ICD-10-CM

## 2024-04-12 DIAGNOSIS — I10 BENIGN ESSENTIAL HYPERTENSION: ICD-10-CM

## 2024-04-12 DIAGNOSIS — M54.41 CHRONIC MIDLINE LOW BACK PAIN WITH BILATERAL SCIATICA: ICD-10-CM

## 2024-04-12 DIAGNOSIS — E78.2 HYPERLIPEMIA, MIXED: ICD-10-CM

## 2024-04-12 PROBLEM — E11.69 HYPERLIPIDEMIA ASSOCIATED WITH TYPE 2 DIABETES MELLITUS (MULTI): Status: RESOLVED | Noted: 2024-01-05 | Resolved: 2024-04-12

## 2024-04-12 PROBLEM — M10.9 GOUT: Status: RESOLVED | Noted: 2023-05-01 | Resolved: 2024-04-12

## 2024-04-12 PROBLEM — R10.9 CHRONIC ABDOMINAL PAIN: Status: RESOLVED | Noted: 2023-11-06 | Resolved: 2024-04-12

## 2024-04-12 PROBLEM — E11.9 TYPE 2 DIABETES MELLITUS WITHOUT COMPLICATION, WITHOUT LONG-TERM CURRENT USE OF INSULIN (MULTI): Status: RESOLVED | Noted: 2023-08-25 | Resolved: 2024-04-12

## 2024-04-12 PROBLEM — M05.711 RHEUMATOID ARTHRITIS INVOLVING RIGHT SHOULDER WITH POSITIVE RHEUMATOID FACTOR (MULTI): Status: RESOLVED | Noted: 2024-01-01 | Resolved: 2024-01-01

## 2024-04-12 PROBLEM — I50.82 BIVENTRICULAR CONGESTIVE HEART FAILURE (MULTI): Status: RESOLVED | Noted: 2023-01-01 | Resolved: 2024-01-01

## 2024-04-12 PROBLEM — Z00.00 MEDICARE ANNUAL WELLNESS VISIT, SUBSEQUENT: Status: RESOLVED | Noted: 2023-11-06 | Resolved: 2024-04-12

## 2024-04-12 PROBLEM — M47.819 OSTEOARTHRITIS OF SPINE WITHOUT MYELOPATHY OR RADICULOPATHY: Status: ACTIVE | Noted: 2024-04-12

## 2024-04-12 PROBLEM — I70.223 ATHEROSCLEROSIS OF NATIVE ARTERIES OF EXTREMITIES WITH REST PAIN, BILATERAL LEGS (MULTI): Status: RESOLVED | Noted: 2023-06-26 | Resolved: 2024-04-12

## 2024-04-12 PROBLEM — E78.5 HYPERLIPIDEMIA ASSOCIATED WITH TYPE 2 DIABETES MELLITUS (MULTI): Status: RESOLVED | Noted: 2024-01-05 | Resolved: 2024-04-12

## 2024-04-12 PROCEDURE — 1159F MED LIST DOCD IN RCRD: CPT | Performed by: INTERNAL MEDICINE

## 2024-04-12 PROCEDURE — 1157F ADVNC CARE PLAN IN RCRD: CPT | Performed by: INTERNAL MEDICINE

## 2024-04-12 PROCEDURE — 1036F TOBACCO NON-USER: CPT | Performed by: INTERNAL MEDICINE

## 2024-04-12 PROCEDURE — 3078F DIAST BP <80 MM HG: CPT | Performed by: INTERNAL MEDICINE

## 2024-04-12 PROCEDURE — 1160F RVW MEDS BY RX/DR IN RCRD: CPT | Performed by: INTERNAL MEDICINE

## 2024-04-12 PROCEDURE — 3074F SYST BP LT 130 MM HG: CPT | Performed by: INTERNAL MEDICINE

## 2024-04-12 PROCEDURE — 99213 OFFICE O/P EST LOW 20 MIN: CPT | Performed by: INTERNAL MEDICINE

## 2024-04-12 RX ORDER — BUDESONIDE, GLYCOPYRROLATE, AND FORMOTEROL FUMARATE 160; 9; 4.8 UG/1; UG/1; UG/1
AEROSOL, METERED RESPIRATORY (INHALATION)
COMMUNITY
Start: 2023-12-02 | End: 2024-04-12 | Stop reason: SDUPTHER

## 2024-04-12 RX ORDER — RIVAROXABAN 2.5 MG/1
2.5 TABLET, FILM COATED ORAL 2 TIMES DAILY
Qty: 180 TABLET | Refills: 3 | Status: SHIPPED | OUTPATIENT
Start: 2024-04-12

## 2024-04-12 RX ORDER — BUDESONIDE, GLYCOPYRROLATE, AND FORMOTEROL FUMARATE 160; 9; 4.8 UG/1; UG/1; UG/1
AEROSOL, METERED RESPIRATORY (INHALATION)
Qty: 10.7 G | Refills: 6 | Status: SHIPPED | OUTPATIENT
Start: 2024-04-12

## 2024-04-12 NOTE — PROGRESS NOTES
Subjective   Patient ID: Brian Low is a 69 y.o. male who presents for Follow-up (3 month /Hospital /Go over CT scans ).    Assessment/Plan     Problem List Items Addressed This Visit       Idiopathic gout     Continue allopurinol check uric acid twice a year keep uric acid less than 6         Chronic obstructive pulmonary disease (Multi)     Continue prednisone daily wraps arousal treatment oxygen pulmonary rehab possible bronchoscopy moderate risk cleared for procedure         Relevant Medications    budesonide-glycopyr-formoterol (Breztri Aerosphere) 160-9-4.8 mcg/actuation HFA aerosol inhaler    Claudication of right lower extremity (CMS-HCC)     Status post revascularization right lower extremity continue Xarelto 2.5 mg a day monitor CBC BMP and PVR once a year         Relevant Medications    Xarelto 2.5 mg tablet    Benign essential hypertension     Patients BP readings reviewed and addressed, as we age our arteries turn stiffer and less elastic. Restricting salt consumption and staying physically fit with regular exercise regimen is the only way to keep our vasculature less tonic. Studies have shown that keeping ideal body wt, exercise routine about 140 to 150 minutes a week, eating variety of plant based diet and drinking plentiful water are quite helpful. Monitor BP twice or once a week at home and bring log to be reviewed by me. Uncontrolled BP has long term consequences including heart failure, myocardial infarction, accelerated atherosclerosis and kidney dysfunction. Therapy reviewed and explained.           Ex-cigarette smoker     Follow-up with the pulmonary nodule clinic's and pulmonary service         Hyperlipemia, mixed     Keep LDL less than 100 continue Mevacor 40 mg a day monitor CMP lipid CPK once a year         GERD without esophagitis     Continue Prilosec monitor CBC magnesium once a year         Chronic midline low back pain with bilateral sciatica     Follow-up with the pain  management         Hospital discharge follow-up - Primary       HPI this is a 69-year-old patient who had a COPD exacerbation admitted to [facility] evaluated by PCP pulmonary treated with oxygen albuterol IV steroids IV antibiotics stabilized discharge outpatient follow-up    Continue have a cough congestion shortness of breath decreased ADL decreased mobility PND orthopnea    Date of admission March 21, 2024 date of discharge March 25, 2024 patient hospitalized since last visit medication list reviewed and  reconciled with discharge medications    Face to face visit with patient discuss discharge medication and outpatient medication ceconciliations and answers all questions to patient's and caregiver review hospital record pending diagnostic test and treatment orders to improved coordinate care across the medical community and  referal with provider/service to support patient's health and health related problems to increase patient's satisfaction by reducing risk of readmission I improving And meeting patient's needs advise bring all prescription and nonprescription medication with you.      Admitting diagnosis acute on chronic hypoxic respiratory failure secondary to COPD exacerbation secondary to bronchitis discharged on 8 Zithromax Omnicef and prednisone    Blood sugar running level    Continue mucus congestions going to be evaluated by pulmonary service Dr. Rosado    Diet-controlled diabetes    Continue have claudication affecting the right lower extremity poor balance seen by Dr. Mota    Review polypharmacy and stop unnecessary medication    Negative for hematuria    Negative for headache or chest pain    Negative for hypoxia    Negative for dementia depression no fall      Past Medical History:   Diagnosis Date    Abnormal level of blood mineral 12/29/2017    Low magnesium levels    Anorexia 04/19/2021    Anorexia    Carpal tunnel syndrome, right upper limb 03/17/2022    Carpal tunnel syndrome of  right wrist    Effusion, unspecified ankle 07/02/2019    Ankle edema    Encounter for immunization 01/25/2022    Encounter for immunization    Encounter for screening for malignant neoplasm of colon 01/25/2022    Colon cancer screening    Encounter for screening for malignant neoplasm of rectum 01/25/2022    Screening for rectal cancer    Essential (primary) hypertension 09/25/2020    Benign essential hypertension    Heart failure, unspecified (Multi) 12/28/2017    CHF (NYHA class III, ACC/AHA stage C)    Hydrocele, unspecified 06/06/2016    Hydrocele, left    Hydrocele, unspecified 09/28/2015    Left hydrocele    Irritable bowel syndrome with constipation 10/06/2022    Irritable bowel syndrome with constipation    Left lower quadrant pain 11/09/2015    Inguinal pain, left    Other conditions influencing health status 02/09/2016    History of cough    Other long term (current) drug therapy 09/25/2020    Long term use of drug    Other specified postprocedural states 07/16/2020    H/O right wrist surgery    Other symptoms and signs involving the musculoskeletal system 01/25/2022    Leg weakness    Pain in left hip 02/18/2020    Left hip pain    Personal history of colonic polyps 07/09/2018    History of colonic polyps    Personal history of diseases of the blood and blood-forming organs and certain disorders involving the immune mechanism 08/17/2020    History of anemia    Personal history of other (healed) physical injury and trauma 01/25/2022    History of trauma    Personal history of other diseases of male genital organs 12/04/2019    History of impotence    Personal history of other diseases of the circulatory system 08/17/2020    History of hypertension    Personal history of other diseases of the digestive system 08/25/2022    History of constipation    Personal history of other diseases of the musculoskeletal system and connective tissue 09/25/2020    History of polymyalgia rheumatica    Personal history of  other diseases of the nervous system and sense organs 04/12/2017    History of otitis media    Personal history of other endocrine, nutritional and metabolic disease 04/15/2022    History of diabetes mellitus    Personal history of other endocrine, nutritional and metabolic disease 01/25/2022    History of vitamin D deficiency    Personal history of other endocrine, nutritional and metabolic disease 08/17/2020    History of hyperlipidemia    Personal history of other specified conditions 11/09/2015    History of abdominal pain    Radiculopathy, cervical region 07/16/2020    Cervical radiculopathy    Radiculopathy, lumbar region 09/25/2020    Chronic lumbar radiculopathy    Rash and other nonspecific skin eruption 01/25/2022    Skin rash    Right lower quadrant pain 06/23/2014    Inguinal pain of both sides    Scrotal pain 09/17/2015    Scrotal pain    Spinal stenosis, lumbosacral region 01/25/2022    Lumbosacral stenosis with neurogenic claudication     Past Surgical History:   Procedure Laterality Date    CARDIAC SURGERY  05/08/2014    Heart Surgery    OTHER SURGICAL HISTORY  08/25/2022    Leg surgery     Allergies   Allergen Reactions    Strawberry Unknown     Current Outpatient Medications   Medication Sig Dispense Refill    albuterol 2.5 mg /3 mL (0.083 %) nebulizer solution Take 3 mL (2.5 mg) by nebulization.      alendronate (Fosamax) 70 mg tablet Take 1 tablet (70 mg) by mouth every 7 days. 12 tablet 3    allopurinol (Zyloprim) 100 mg tablet ONE A DAY 90 tablet 3    dilTIAZem CD (Cartia XT) 180 mg 24 hr capsule Take 1 capsule (180 mg) by mouth 2 times a day. 180 capsule 3    lovastatin (Mevacor) 40 mg tablet Take 1 tablet (40 mg) by mouth once daily at bedtime. 90 tablet 3    montelukast (Singulair) 10 mg tablet Take 1 tablet (10 mg) by mouth once daily. 90 tablet 3    predniSONE (Deltasone) 5 mg tablet Take 1 tablet (5 mg) by mouth once daily. 90 tablet 3    roflumilast (Daliresp) 500 mcg tablet Take 1  tablet (500 mcg) by mouth once daily. 90 tablet 3    valsartan (Diovan) 80 mg tablet Take 1 tablet (80 mg) by mouth once daily. 90 tablet 3    Xarelto 2.5 mg tablet Take 1 tablet (2.5 mg) by mouth 2 times a day. 180 tablet 3    budesonide-glycopyr-formoterol (Breztri Aerosphere) 160-9-4.8 mcg/actuation HFA aerosol inhaler Inhale 1 puff bid 10.7 g 6     No current facility-administered medications for this visit.     No family history on file.  Social History     Socioeconomic History    Marital status:      Spouse name: None    Number of children: None    Years of education: None    Highest education level: None   Occupational History    None   Tobacco Use    Smoking status: Former     Types: Cigarettes    Smokeless tobacco: Former   Substance and Sexual Activity    Alcohol use: Not Currently    Drug use: Not Currently    Sexual activity: None   Other Topics Concern    None   Social History Narrative    None     Social Determinants of Health     Financial Resource Strain: Low Risk  (2/16/2024)    Received from [facility]    Overall Financial Resource Strain (CARDIA)     Difficulty of Paying Living Expenses: Not hard at all   Food Insecurity: No Food Insecurity (2/16/2024)    Received from [facility]    Hunger Vital Sign     Worried About Running Out of Food in the Last Year: Never true     Ran Out of Food in the Last Year: Never true   Transportation Needs: No Transportation Needs (2/16/2024)    Received from [facility]    PRAPARE - Transportation     Lack of Transportation (Medical): No     Lack of Transportation (Non-Medical): No   Physical Activity: Sufficiently Active (2/16/2024)    Received from [facility]    Exercise Vital Sign     Days of Exercise per Week: 2 days     Minutes of Exercise per Session: 90 min   Stress: No Stress Concern Present (2/16/2024)    Received from [facility]    [employer] [place] [employer] - Occupational Stress Questionnaire     Feeling of Stress : Only a little    Social Connections: Moderately Isolated (2/16/2024)    Received from [facility]    Social Connection and Isolation Panel [NHANES]     Frequency of Communication with Friends and Family: More than three times a week     Frequency of Social Gatherings with Friends and Family: Once a week     Attends Restorationism Services: Never     Active Member of Clubs or Organizations: No     Attends Club or Organization Meetings: Never     Marital Status:    Intimate Partner Violence: Not At Risk (2/16/2024)    Received from [facility]    Humiliation, Afraid, Rape, and Kick questionnaire     Fear of Current or Ex-Partner: No     Emotionally Abused: No     Physically Abused: No     Sexually Abused: No   Housing Stability: Low Risk  (2/16/2024)    [facility]    Housing Stability Vital Sign     Unable to Pay for Housing in the Last Year: No     Number of Places Lived in the Last Year: 1     Unstable Housing in the Last Year: No     Immunization History   Administered Date(s) Administered    Flu vaccine (IIV4), preservative free *Check age/dose* 10/22/2014, 08/09/2017, 10/03/2018, 09/06/2019, 09/25/2020    Flu vaccine, quadrivalent, high-dose, preservative free, age 65y+ (FLUZONE) 10/22/2021, 10/03/2022, 09/02/2023    Hepatitis B vaccine, adult (RECOMBIVAX, ENGERIX) 11/13/2006, 12/11/2006    HiB, unspecified 11/21/2008    Influenza, Unspecified 11/16/2007, 10/10/2008, 01/05/2010, 09/06/2019, 09/25/2020, 10/03/2022    Influenza, injectable, quadrivalent 10/22/2021    Influenza, seasonal, injectable 09/06/2011, 09/27/2012, 09/17/2013, 10/22/2014, 10/22/2014, 11/09/2015, 08/09/2017, 10/03/2018, 09/02/2023    Influenza, seasonal, injectable, preservative free 11/09/2015    Juan SARS-CoV-2 Vaccination 03/12/2021, 11/03/2021    PPD Test 04/30/2007, 02/12/2008    Pfizer COVID-19 vaccine, Fall 2023, 12 years and older, (30mcg/0.3mL) 11/15/2023    Pfizer COVID-19 vaccine, bivalent, age 12 years and older (30 mcg/0.3  "mL) 10/03/2022    Pfizer Gray Cap SARS-CoV-2 04/11/2022    Pfizer Purple Cap SARS-CoV-2 10/03/2022, 11/15/2023    Pneumococcal polysaccharide vaccine, 23-valent, age 2 years and older (PNEUMOVAX 23) 07/20/2005, 11/21/2008, 06/08/2012, 02/13/2015    RSV, 60 Years And Older (AREXVY) 09/02/2023    Td vaccine, age 7 years and older (TDVAX) 06/08/2012    Tdap vaccine, age 7 year and older (BOOSTRIX, ADACEL) 12/10/2008       Review of Systems  Review of systems is otherwise negative unless stated above or in history of present illness.    Objective   Visit Vitals  /63 (BP Location: Left arm, Patient Position: Sitting, BP Cuff Size: Adult)   Pulse 83   Temp 36.2 °C (97.1 °F)   Ht 1.727 m (5' 8\")   Wt 61.6 kg (135 lb 12.8 oz)   SpO2 92%   BMI 20.65 kg/m²   Smoking Status Former   BSA 1.72 m²     Physical Exam  Constitutional: Cachexia of chronic disease     General: not in acute distress.   HENT: Runny nose     Head: Normocephalic and atraumatic.      Nose: Nose normal.   Eyes: Cataract S     Extraocular Movements: Extraocular movements intact.      Conjunctiva/sclera: Conjunctivae normal.   Cardiovascular:      S1-S2 S4   pulmonary: Barrel chest crackles rales rhonchi diminished air entry bilaterally healed tracheostomy wound     Effort: Pulmonary effort is normal.      Breath sounds: Normal, Bilat Equal AE  Skin:  Ischemic skin right lower extremity.   Neurological:      Mental Status: He is alert and oriented to person, place, and time.   Psychiatric:         Anxiety depression without suicide.   Musculoskeletal   Osteoarthritis osteopenia gouty arthritis small joint  GI epigastric tenderness    Hospital Outpatient Visit on 01/22/2024   Component Date Value Ref Range Status    AV pk gary 01/22/2024 1.26  m/s Final    AV mn grad 01/22/2024 3.2  mmHg Final    LVOT diam 01/22/2024 2.12  cm Final    LV EF 01/22/2024 58  % Final    MV E/A ratio 01/22/2024 0.73   Final    LA vol index A/L 01/22/2024 21.7  ml/m2 Final    " Tricuspid annular plane systolic e* 01/22/2024 1.6  cm Final    RV free wall pk S' 01/22/2024 0.60  cm/s Final    LVIDd 01/22/2024 3.25  cm Final    RVSP 01/22/2024 31.4  mmHg Final    Aortic Valve Area by Continuity of* 01/22/2024 2.74  cm2 Final    Aortic Valve Area by Continuity of* 01/22/2024 2.93  cm2 Final    AV pk grad 01/22/2024 6.3  mmHg Final    LV A4C EF 01/22/2024 67.6   Final       Radiology: Reviewed imaging in powerchart.  No results found.      Charting was completed using voice recognition technology and may include unintended errors.

## 2024-04-12 NOTE — PROGRESS NOTES
Call regarding appt. with PCP on 4/12/2024 after hospitalization.  At time of outreach call the patient feels as if their condition has improved since last visit.  Reviewed the PCP appointment with the pt and addressed any questions or concerns.  Brian is doing pretty good, has to repeat CT scan due to some sort of error.

## 2024-04-12 NOTE — ASSESSMENT & PLAN NOTE
Status post revascularization right lower extremity continue Xarelto 2.5 mg a day monitor CBC BMP and PVR once a year

## 2024-04-12 NOTE — ASSESSMENT & PLAN NOTE
Continue prednisone daily wraps arousal treatment oxygen pulmonary rehab possible bronchoscopy moderate risk cleared for procedure

## 2024-04-16 ENCOUNTER — APPOINTMENT (OUTPATIENT)
Dept: RADIOLOGY | Facility: HOSPITAL | Age: 69
End: 2024-04-16
Payer: COMMERCIAL

## 2024-04-16 ENCOUNTER — APPOINTMENT (OUTPATIENT)
Dept: RADIOLOGY | Facility: CLINIC | Age: 69
End: 2024-04-16
Payer: COMMERCIAL

## 2024-04-17 DIAGNOSIS — J44.9 CHRONIC OBSTRUCTIVE PULMONARY DISEASE, UNSPECIFIED COPD TYPE (MULTI): ICD-10-CM

## 2024-04-18 RX ORDER — MONTELUKAST SODIUM 10 MG/1
10 TABLET ORAL DAILY
Qty: 90 TABLET | Refills: 3 | Status: SHIPPED | OUTPATIENT
Start: 2024-04-18

## 2024-04-19 ENCOUNTER — APPOINTMENT (OUTPATIENT)
Dept: VASCULAR MEDICINE | Facility: CLINIC | Age: 69
End: 2024-04-19
Payer: COMMERCIAL

## 2024-05-02 DIAGNOSIS — E11.9 DIET-CONTROLLED DIABETES MELLITUS (MULTI): ICD-10-CM

## 2024-05-02 RX ORDER — DEXTROSE 4 G
TABLET,CHEWABLE ORAL
Qty: 1 EACH | Refills: 0 | Status: SHIPPED | OUTPATIENT
Start: 2024-05-02

## 2024-05-06 ENCOUNTER — HOSPITAL ENCOUNTER (OUTPATIENT)
Dept: RADIOLOGY | Facility: CLINIC | Age: 69
Discharge: HOME | End: 2024-05-06
Payer: COMMERCIAL

## 2024-05-06 DIAGNOSIS — B59: ICD-10-CM

## 2024-05-06 PROCEDURE — 71250 CT THORAX DX C-: CPT

## 2024-05-06 PROCEDURE — 71250 CT THORAX DX C-: CPT | Performed by: RADIOLOGY

## 2024-05-07 ENCOUNTER — TELEPHONE (OUTPATIENT)
Dept: PRIMARY CARE | Facility: CLINIC | Age: 69
End: 2024-05-07
Payer: COMMERCIAL

## 2024-05-07 DIAGNOSIS — E11.9 DIET-CONTROLLED DIABETES MELLITUS (MULTI): ICD-10-CM

## 2024-05-07 DIAGNOSIS — E11.9 TYPE 2 DIABETES MELLITUS WITHOUT COMPLICATION, WITHOUT LONG-TERM CURRENT USE OF INSULIN (MULTI): ICD-10-CM

## 2024-05-09 ENCOUNTER — TELEPHONE (OUTPATIENT)
Dept: PRIMARY CARE | Facility: CLINIC | Age: 69
End: 2024-05-09
Payer: COMMERCIAL

## 2024-05-09 DIAGNOSIS — K29.70 GASTRITIS WITHOUT BLEEDING, UNSPECIFIED CHRONICITY, UNSPECIFIED GASTRITIS TYPE: ICD-10-CM

## 2024-05-09 RX ORDER — METOCLOPRAMIDE 5 MG/1
5 TABLET ORAL 2 TIMES DAILY
Qty: 30 TABLET | Refills: 0 | Status: SHIPPED | OUTPATIENT
Start: 2024-05-09

## 2024-05-09 NOTE — TELEPHONE ENCOUNTER
----- Message from Preeti Slater MD sent at 5/9/2024 11:28 AM EDT -----  1. Interval increase in adherent mucus/aspirated material with left  lower lobe mucoid impaction and basilar infiltrate/volume loss.  2. Recommend a 1 month three-month follow-up CT scan to confirm  resolution of these basilar opacities.  3. There are no suspicious upper lobe opacities. There is extensive  emphysematous changes.  4. Focal subglottic stenosis and correlate with any concern for upper  airway obstruction.  5. Severe atherosclerotic disease of the aorta and native coronary  arteries status post CABG.  6. And fluid-filled small bowel and correlate with a component of  gastroparesis.      Chronic lung disease follow-up with the pulmonary service within a 4 weeks    Chronic gastritis with gastroparesis advised Reglan 5 mg twice a day #30 follow-up with GI Dr. Wolff or Dr. Jerez    Severe coronary calcification advised to follow-up with Dr. Shanks

## 2024-05-09 NOTE — TELEPHONE ENCOUNTER
----- Message from Preeti Slater MD sent at 5/9/2024 11:28 AM EDT -----  1. Interval increase in adherent mucus/aspirated material with left  lower lobe mucoid impaction and basilar infiltrate/volume loss.  2. Recommend a 1 month three-month follow-up CT scan to confirm  resolution of these basilar opacities.  3. There are no suspicious upper lobe opacities. There is extensive  emphysematous changes.  4. Focal subglottic stenosis and correlate with any concern for upper  airway obstruction.  5. Severe atherosclerotic disease of the aorta and native coronary  arteries status post CABG.  6. And fluid-filled small bowel and correlate with a component of  gastroparesis.      Chronic lung disease follow-up with the pulmonary service within a 4 weeks    Chronic gastritis with gastroparesis advised Reglan 5 mg twice a day #30 follow-up with GI Dr. Wolff or Dr. Jerez    Severe coronary calcification advised to follow-up with Dr. Shanks   107

## 2024-05-17 ENCOUNTER — APPOINTMENT (OUTPATIENT)
Dept: PRIMARY CARE | Facility: CLINIC | Age: 69
End: 2024-05-17
Payer: COMMERCIAL

## 2024-05-20 ENCOUNTER — LAB REQUISITION (OUTPATIENT)
Dept: LAB | Facility: HOSPITAL | Age: 69
End: 2024-05-20
Payer: COMMERCIAL

## 2024-05-21 LAB
LABORATORY COMMENT REPORT: NORMAL
PATH REPORT.GROSS SPEC: NORMAL
PATH REPORT.TOTAL CANCER: NORMAL

## 2024-05-29 ENCOUNTER — TELEPHONE (OUTPATIENT)
Dept: PRIMARY CARE | Facility: CLINIC | Age: 69
End: 2024-05-29
Payer: COMMERCIAL

## 2024-05-29 DIAGNOSIS — Z00.00 HEALTH CARE MAINTENANCE: ICD-10-CM

## 2024-05-30 RX ORDER — FLUTICASONE PROPIONATE 50 MCG
1 SPRAY, SUSPENSION (ML) NASAL DAILY
Qty: 16 G | Refills: 11 | Status: SHIPPED | OUTPATIENT
Start: 2024-05-30 | End: 2025-05-30

## 2024-06-10 ENCOUNTER — APPOINTMENT (OUTPATIENT)
Dept: PRIMARY CARE | Facility: CLINIC | Age: 69
End: 2024-06-10
Payer: COMMERCIAL

## 2024-06-10 DIAGNOSIS — J96.90 RESPIRATORY FAILURE, UNSPECIFIED CHRONICITY, UNSPECIFIED WHETHER WITH HYPOXIA OR HYPERCAPNIA (MULTI): ICD-10-CM

## 2024-06-10 DIAGNOSIS — I25.10 CORONARY ARTERY DISEASE INVOLVING NATIVE CORONARY ARTERY, UNSPECIFIED WHETHER ANGINA PRESENT, UNSPECIFIED WHETHER NATIVE OR TRANSPLANTED HEART: ICD-10-CM

## 2024-06-10 DIAGNOSIS — J44.9 CHRONIC OBSTRUCTIVE PULMONARY DISEASE, UNSPECIFIED COPD TYPE (MULTI): ICD-10-CM

## 2024-06-10 DIAGNOSIS — J69.0 ASPIRATION PNEUMONIA, UNSPECIFIED ASPIRATION PNEUMONIA TYPE, UNSPECIFIED LATERALITY, UNSPECIFIED PART OF LUNG (MULTI): Primary | ICD-10-CM

## 2024-06-10 DIAGNOSIS — I73.9 PAD (PERIPHERAL ARTERY DISEASE) (CMS-HCC): ICD-10-CM

## 2024-06-10 PROCEDURE — 99239 HOSP IP/OBS DSCHRG MGMT >30: CPT | Performed by: INTERNAL MEDICINE

## 2024-06-11 ENCOUNTER — PATIENT OUTREACH (OUTPATIENT)
Dept: CARE COORDINATION | Facility: CLINIC | Age: 69
End: 2024-06-11
Payer: COMMERCIAL

## 2024-06-11 RX ORDER — GUAIFENESIN 600 MG/1
600 TABLET, EXTENDED RELEASE ORAL 2 TIMES DAILY
COMMUNITY

## 2024-06-11 RX ORDER — ACETYLCYSTEINE 200 MG/ML
2 SOLUTION ORAL; RESPIRATORY (INHALATION)
COMMUNITY

## 2024-06-11 RX ORDER — SERTRALINE HYDROCHLORIDE 25 MG/1
25 TABLET, FILM COATED ORAL DAILY
COMMUNITY

## 2024-06-11 RX ORDER — HYDROCODONE BITARTRATE AND ACETAMINOPHEN 5; 325 MG/1; MG/1
1 TABLET ORAL 2 TIMES DAILY PRN
COMMUNITY

## 2024-06-11 RX ORDER — PREDNISONE 10 MG/1
10 TABLET ORAL DAILY
COMMUNITY

## 2024-06-11 RX ORDER — GABAPENTIN 100 MG/1
100 CAPSULE ORAL 2 TIMES DAILY
COMMUNITY

## 2024-06-11 RX ORDER — CALCIUM CARBONATE 200(500)MG
1 TABLET,CHEWABLE ORAL DAILY
COMMUNITY

## 2024-06-11 RX ORDER — AMOXICILLIN AND CLAVULANATE POTASSIUM 500; 125 MG/1; MG/1
1 TABLET, FILM COATED ORAL 3 TIMES DAILY
COMMUNITY

## 2024-06-11 NOTE — PROGRESS NOTES
Discharge Facility:[facility]  Discharge Diagnosis:  Pneumonia   Respiratory failure   COPD exacerbation     Admission Date:6/7/2024  Discharge Date: 6/10/2024    PCP Appointment Date:6/20/2024  Specialist Appointment Date: Pulmonology TBD  Hospital Encounter and Summary: Linked   See discharge assessment below for further details  Engagement  Call Start Time: 1310 (6/11/2024  1:26 PM)    Medications  Medications reviewed with patient/caregiver?: Yes (6/11/2024  1:26 PM)  Is the patient having any side effects they believe may be caused by any medication additions or changes?: No (6/11/2024  1:26 PM)  Does the patient have all medications ordered at discharge?: Yes (6/11/2024  1:26 PM)  Care Management Interventions: No intervention needed (6/11/2024  1:26 PM)  Prescription Comments: -- (Zoloft 25 mg one qd, Prednisone 10 mg 4 x 5 days, 2 x 5 days, 1 x 5 days, Mucomyst 20% 2 ml tid inhalation solution/neb, Augmentin 500/125 mg one tid, Calcium Carbonate chewable 600 one qd, Neurontin 100 mg one qd, Norco 5/325 one bid prn) (6/11/2024  1:26 PM)  Is the patient taking all medications as directed (includes completed medication regime)?: Yes (6/11/2024  1:26 PM)  Care Management Interventions: Provided patient education (Brian asked about Zoloft, we reviewed is new and not to suddenly stop, he has no refill so will need to get from PCP.) (6/11/2024  1:26 PM)    Appointments  Does the patient have a primary care provider?: Yes (6/11/2024  1:26 PM)  Care Management Interventions: Verified appointment date/time/provider (6/20/2024) (6/11/2024  1:26 PM)  Has the patient kept scheduled appointments due by today?: Yes (6/11/2024  1:26 PM)    Self Management  What is the home health agency?: -- (N/A) (6/11/2024  1:26 PM)  What Durable Medical Equipment (DME) was ordered?: -- (N/A) (6/11/2024  1:26 PM)    Patient Teaching  Does the patient have access to their discharge instructions?: Yes (6/11/2024  1:26 PM)  What is the  patient's perception of their health status since discharge?: Improving (6/11/2024  1:26 PM)  Is the patient/caregiver able to teach back the hierarchy of who to call/visit for symptoms/problems? PCP, Specialist, Home Health nurse, Urgent Care, ED, 911: Yes (6/11/2024  1:26 PM)    Wrap Up  Wrap Up Additional Comments: -- (Brian is feeling much better and that he is breathing much better. We reviewed his medications and he verbalized understanding of.He will fu with PCP and Pulm. He will contact provider if concerns.) (6/11/2024  1:26 PM)

## 2024-06-20 ENCOUNTER — APPOINTMENT (OUTPATIENT)
Dept: PRIMARY CARE | Facility: CLINIC | Age: 69
End: 2024-06-20
Payer: COMMERCIAL